# Patient Record
Sex: MALE | Race: WHITE | NOT HISPANIC OR LATINO | Employment: OTHER | URBAN - METROPOLITAN AREA
[De-identification: names, ages, dates, MRNs, and addresses within clinical notes are randomized per-mention and may not be internally consistent; named-entity substitution may affect disease eponyms.]

---

## 2017-03-12 ENCOUNTER — OFFICE VISIT (OUTPATIENT)
Dept: URGENT CARE | Facility: MEDICAL CENTER | Age: 72
End: 2017-03-12
Payer: MEDICARE

## 2017-03-12 DIAGNOSIS — R69 ILLNESS: ICD-10-CM

## 2017-03-12 PROCEDURE — G0463 HOSPITAL OUTPT CLINIC VISIT: HCPCS

## 2017-03-12 PROCEDURE — 99213 OFFICE O/P EST LOW 20 MIN: CPT

## 2017-03-13 ENCOUNTER — APPOINTMENT (OUTPATIENT)
Dept: LAB | Facility: HOSPITAL | Age: 72
End: 2017-03-13
Payer: MEDICARE

## 2017-03-13 DIAGNOSIS — R69 ILLNESS: ICD-10-CM

## 2017-03-13 PROCEDURE — 87798 DETECT AGENT NOS DNA AMP: CPT

## 2017-03-14 LAB
FLUAV AG SPEC QL: DETECTED
FLUBV AG SPEC QL: ABNORMAL
RSV B RNA SPEC QL NAA+PROBE: ABNORMAL

## 2017-06-30 ENCOUNTER — ALLSCRIPTS OFFICE VISIT (OUTPATIENT)
Dept: OTHER | Facility: OTHER | Age: 72
End: 2017-06-30

## 2017-08-04 ENCOUNTER — GENERIC CONVERSION - ENCOUNTER (OUTPATIENT)
Dept: OTHER | Facility: OTHER | Age: 72
End: 2017-08-04

## 2018-01-12 VITALS
DIASTOLIC BLOOD PRESSURE: 82 MMHG | BODY MASS INDEX: 36.26 KG/M2 | WEIGHT: 231 LBS | HEIGHT: 67 IN | HEART RATE: 72 BPM | SYSTOLIC BLOOD PRESSURE: 140 MMHG

## 2018-02-01 RX ORDER — SODIUM FLUORIDE 5 MG/ML
PASTE, DENTIFRICE DENTAL
COMMUNITY
End: 2018-02-02 | Stop reason: ALTCHOICE

## 2018-02-01 RX ORDER — CHLORAL HYDRATE 500 MG
1000 CAPSULE ORAL SEE ADMIN INSTRUCTIONS
COMMUNITY
End: 2019-06-04

## 2018-02-01 RX ORDER — VALSARTAN AND HYDROCHLOROTHIAZIDE 160; 25 MG/1; MG/1
1 TABLET ORAL DAILY
COMMUNITY
Start: 2012-04-19 | End: 2018-02-02 | Stop reason: SDUPTHER

## 2018-02-01 RX ORDER — PRAVASTATIN SODIUM 20 MG
1 TABLET ORAL DAILY
COMMUNITY

## 2018-02-01 RX ORDER — B-COMPLEX WITH VITAMIN C
1 TABLET ORAL DAILY
COMMUNITY

## 2018-02-01 RX ORDER — MULTIVITAMIN
1 TABLET ORAL DAILY
COMMUNITY

## 2018-02-02 ENCOUNTER — OFFICE VISIT (OUTPATIENT)
Dept: CARDIOLOGY CLINIC | Facility: CLINIC | Age: 73
End: 2018-02-02
Payer: MEDICARE

## 2018-02-02 ENCOUNTER — ALLSCRIPTS OFFICE VISIT (OUTPATIENT)
Dept: OTHER | Facility: OTHER | Age: 73
End: 2018-02-02

## 2018-02-02 VITALS
BODY MASS INDEX: 36.73 KG/M2 | HEART RATE: 63 BPM | WEIGHT: 234 LBS | SYSTOLIC BLOOD PRESSURE: 132 MMHG | DIASTOLIC BLOOD PRESSURE: 76 MMHG | HEIGHT: 67 IN

## 2018-02-02 DIAGNOSIS — I48.20 CHRONIC A-FIB (HCC): Primary | ICD-10-CM

## 2018-02-02 DIAGNOSIS — I15.9 SECONDARY HYPERTENSION: Primary | ICD-10-CM

## 2018-02-02 DIAGNOSIS — I48.19 PERSISTENT ATRIAL FIBRILLATION (HCC): ICD-10-CM

## 2018-02-02 PROCEDURE — 93000 ELECTROCARDIOGRAM COMPLETE: CPT | Performed by: INTERNAL MEDICINE

## 2018-02-02 PROCEDURE — 99214 OFFICE O/P EST MOD 30 MIN: CPT | Performed by: INTERNAL MEDICINE

## 2018-02-02 RX ORDER — VALSARTAN AND HYDROCHLOROTHIAZIDE 160; 25 MG/1; MG/1
1 TABLET ORAL DAILY
Qty: 90 TABLET | Refills: 3 | Status: SHIPPED | OUTPATIENT
Start: 2018-02-02 | End: 2019-01-10 | Stop reason: SDUPTHER

## 2018-02-02 RX ORDER — AMOXICILLIN 500 MG/1
2000 CAPSULE ORAL DAILY PRN
COMMUNITY

## 2018-02-02 NOTE — TELEPHONE ENCOUNTER
Doni Faust wants Valsartan and Xarelto ordered thru his "postal prescription" but that pharmacy  Information is not on file  I tried calling his home # several times, no answer, no machine  I tried work #, he does not work there, incorrect #  Also called [facility] to get Medical Records # to request lab work from 2017  No fax # was provided yet, I'll call again

## 2018-02-02 NOTE — PROGRESS NOTES
Cardiology Follow Up    Sung Bautista  1945  [id number]  HEART & VASCULAR [facility] [place]  [address]     HPI: 8 month F/U AS/AF and prior small stroke with onset AF, very active and no bruising bleeding on ASA and Xarelto; no falls, regular daily exercise; home BPs normal, he brought list today, labs done regularly but not yet available    CV PMH/AS/stroke/AF; probably bicuspid valve, which became symptomatic in 2007  #23 bioprosthesis with excellent clinical results  PAFib post op, warfarin D/Gerard 2008  Cath 2007 minimal CAD  Root/Ao diameter nl   Atrial fibrillation, silent onset 2012, presented as stroke mild speech, visual and motor deficit, which have cleared  No real permanent deficit other than possible short term memory problem  Testing: Echo 2016, AV mean 20, EF 60%, aorta diameter nl; Holter 2016, mean rate 63,     BP excellent control, rare orthostasis       1  Chronic a-fib ([address] )  POCT ECG    ECG 12 lead       Interval History: no events  [place] 2017 was not bridged, next in 5 years    There is no problem list on file for this patient  No past medical history on file  Social History     Social History    Marital status: /Civil Union     Spouse name: N/A    Number of children: N/A    Years of education: N/A     Occupational History    Not on file  Social History Main Topics    Smoking status: Former Smoker     Years: 25 00     Types: Cigarettes     Quit date: 1992    Smokeless tobacco: Never Used    Alcohol use Not on file    Drug use: Unknown    Sexual activity: Not on file     Other Topics Concern    Not on file     Social History Narrative    No narrative on file      No family history on file  No past surgical history on file      Current Outpatient Prescriptions:     amoxicillin (AMOXIL) 500 mg capsule, Take 2,000 mg by mouth daily as needed (prior to dentist appt), Disp: , Rfl:     aspirin 81 MG tablet, Take 1 tablet by mouth daily, Disp: , Rfl:     Multiple Vitamin Essential TABS, Take 1 tablet by mouth daily, Disp: , Rfl:     Omega-3 Fatty Acids (FISH OIL) 1,000 mg, Take by mouth, Disp: , Rfl:     pravastatin (PRAVACHOL) 20 mg tablet, Take 1 tablet by mouth daily, Disp: , Rfl:     rivaroxaban (XARELTO) 20 mg tablet, Take 1 tablet by mouth daily, Disp: , Rfl:     valsartan-hydrochlorothiazide (DIOVAN-HCT) 160-25 MG per tablet, Take 1 tablet by mouth daily, Disp: , Rfl:     VITAMIN B COMPLEX-C CAPS, Take 1 capsule by mouth daily, Disp: , Rfl:   No Known Allergies    Labs:  No visits with results within 6 Month(s) from this visit  Latest known visit with results is:   Appointment on 03/13/2017   Component Date Value    INFLU A PCR 03/13/2017 Detected*    INFLU B PCR 03/13/2017 None Detected     RSV PCR 03/13/2017 None Detected      Imaging: No results found  Review of Systems:  Review of Systems   Constitutional: Negative for activity change and appetite change  Respiratory: Negative for cough, choking, chest tightness and shortness of breath  Cardiovascular: Negative for chest pain, palpitations and leg swelling  Endocrine: Negative for heat intolerance  Neurological: Negative for dizziness, syncope, facial asymmetry, weakness and light-headedness  Physical Exam:  Physical Exam   Constitutional: He appears well-developed and well-nourished  Neck: Normal carotid pulses, no hepatojugular reflux and no JVD present  Carotid bruit is not present  No tracheal deviation present  No thyromegaly present  Cardiovascular: Exam reveals no gallop  Murmur (soft AO II) heard  Pulses:       Dorsalis pedis pulses are 3+ on the right side, and 3+ on the left side  Posterior tibial pulses are 3+ on the right side, and 3+ on the left side  Pulmonary/Chest: Effort normal and breath sounds normal  No accessory muscle usage   No respiratory distress  He has no rales  Abdominal: Soft  Bowel sounds are normal  He exhibits no distension  There is no tenderness         Discussion/Summary: Stable 8 month return, no new testing

## 2018-02-15 NOTE — PROGRESS NOTES
I have reviewed the notes, assessments, and/or procedures performed by [facility], I  with her/his documentation of Ricky Rojas  Please tell him cholesterol is still high despite pravastatin, it was 223      I cannot recall if he tolerated higher dose pravastatin over be willing to switch to a better statin, atorvastatin or rosuvastatin, both are now generic so 6 been should not be an issue

## 2018-05-14 ENCOUNTER — HOSPITAL ENCOUNTER (EMERGENCY)
Facility: HOSPITAL | Age: 73
Discharge: HOME | End: 2018-05-14
Attending: EMERGENCY MEDICINE
Payer: MEDICARE

## 2018-05-14 ENCOUNTER — HOSPITAL ENCOUNTER (EMERGENCY)
Facility: HOSPITAL | Age: 73
Discharge: HOME | End: 2018-05-14
Attending: EMERGENCY MEDICINE | Admitting: EMERGENCY MEDICINE
Payer: MEDICARE

## 2018-05-14 VITALS
BODY MASS INDEX: 36.95 KG/M2 | RESPIRATION RATE: 18 BRPM | HEART RATE: 86 BPM | SYSTOLIC BLOOD PRESSURE: 146 MMHG | OXYGEN SATURATION: 99 % | WEIGHT: 235.89 LBS | TEMPERATURE: 98.6 F | DIASTOLIC BLOOD PRESSURE: 80 MMHG

## 2018-05-14 VITALS
BODY MASS INDEX: 36.95 KG/M2 | RESPIRATION RATE: 20 BRPM | DIASTOLIC BLOOD PRESSURE: 87 MMHG | HEART RATE: 96 BPM | SYSTOLIC BLOOD PRESSURE: 177 MMHG | OXYGEN SATURATION: 100 % | TEMPERATURE: 99.2 F | WEIGHT: 235.89 LBS

## 2018-05-14 DIAGNOSIS — R04.0 RIGHT-SIDED EPISTAXIS: Primary | ICD-10-CM

## 2018-05-14 LAB
APTT PPP: 36 SECONDS (ref 23–35)
BASOPHILS # BLD AUTO: 0.03 THOUSANDS/ΜL (ref 0–0.1)
BASOPHILS NFR BLD AUTO: 0 % (ref 0–1)
EOSINOPHIL # BLD AUTO: 0.04 THOUSAND/ΜL (ref 0–0.61)
EOSINOPHIL NFR BLD AUTO: 1 % (ref 0–6)
ERYTHROCYTE [DISTWIDTH] IN BLOOD BY AUTOMATED COUNT: 12.6 % (ref 11.6–15.1)
HCT VFR BLD AUTO: 44.2 % (ref 36.5–49.3)
HGB BLD-MCNC: 15.2 G/DL (ref 12–17)
INR PPP: 1.31 (ref 0.86–1.16)
LYMPHOCYTES # BLD AUTO: 1.29 THOUSANDS/ΜL (ref 0.6–4.47)
LYMPHOCYTES NFR BLD AUTO: 15 % (ref 14–44)
MCH RBC QN AUTO: 30.4 PG (ref 26.8–34.3)
MCHC RBC AUTO-ENTMCNC: 34.4 G/DL (ref 31.4–37.4)
MCV RBC AUTO: 88 FL (ref 82–98)
MONOCYTES # BLD AUTO: 0.84 THOUSAND/ΜL (ref 0.17–1.22)
MONOCYTES NFR BLD AUTO: 10 % (ref 4–12)
NEUTROPHILS # BLD AUTO: 6.42 THOUSANDS/ΜL (ref 1.85–7.62)
NEUTS SEG NFR BLD AUTO: 74 % (ref 43–75)
PLATELET # BLD AUTO: 285 THOUSANDS/UL (ref 149–390)
PMV BLD AUTO: 11.7 FL (ref 8.9–12.7)
PROTHROMBIN TIME: 16.7 SECONDS (ref 12.1–14.4)
RBC # BLD AUTO: 5 MILLION/UL (ref 3.88–5.62)
WBC # BLD AUTO: 8.62 THOUSAND/UL (ref 4.31–10.16)

## 2018-05-14 PROCEDURE — 85610 PROTHROMBIN TIME: CPT | Performed by: PHYSICIAN ASSISTANT

## 2018-05-14 PROCEDURE — 36415 COLL VENOUS BLD VENIPUNCTURE: CPT | Performed by: PHYSICIAN ASSISTANT

## 2018-05-14 PROCEDURE — 99283 EMERGENCY DEPT VISIT LOW MDM: CPT

## 2018-05-14 PROCEDURE — 85025 COMPLETE CBC W/AUTO DIFF WBC: CPT | Performed by: PHYSICIAN ASSISTANT

## 2018-05-14 PROCEDURE — 99284 EMERGENCY DEPT VISIT MOD MDM: CPT

## 2018-05-14 PROCEDURE — 85730 THROMBOPLASTIN TIME PARTIAL: CPT | Performed by: PHYSICIAN ASSISTANT

## 2018-05-14 RX ORDER — TRANEXAMIC ACID 100 MG/ML
500 INJECTION, SOLUTION INTRAVENOUS ONCE
Status: COMPLETED | OUTPATIENT
Start: 2018-05-14 | End: 2018-05-14

## 2018-05-14 RX ORDER — OXYMETAZOLINE HYDROCHLORIDE 0.05 G/100ML
2 SPRAY NASAL ONCE
Status: COMPLETED | OUTPATIENT
Start: 2018-05-14 | End: 2018-05-14

## 2018-05-14 RX ADMIN — TOPICAL ANESTHETIC 2 SPRAY: 200 SPRAY DENTAL; PERIODONTAL at 11:25

## 2018-05-14 RX ADMIN — TRANEXAMIC ACID 500 MG: 100 INJECTION, SOLUTION INTRAVENOUS at 10:00

## 2018-05-14 RX ADMIN — OXYMETAZOLINE HYDROCHLORIDE 2 SPRAY: 5 SPRAY NASAL at 10:00

## 2018-05-14 RX ADMIN — Medication 1 APPLICATION: at 23:49

## 2018-05-14 RX ADMIN — SILVER NITRATE APPLICATORS 1 APPLICATOR: 25; 75 STICK TOPICAL at 11:25

## 2018-05-14 NOTE — ED PROVIDER NOTES
History  Chief Complaint   Patient presents with    Nose Bleed     Pt brought to ER via EMS from home with c/o B/L nare epistaxis s/p 1 episode yesterday and after waking up this morning "feeling dripping in the back of my throat"  No active bleeding noted D/T pt "shoved 2 cotton balls in my nose"  +Xarelto     64-year-old male with past medical history of CVA, hypertension, atrial fibrillation, bladder cancer, who is currently taking Xarelto for DVT and prosthetic heart valve, who presents to the emergency department for epistaxis x3 days  Patient states that he has had 1 episode of epistaxis daily for the past 3 days  He endorses multiple large clots  States that epistaxis usually starts after he sneezes or blows his nose very strongly  States that the episodes usually stop after 30 minutes with gauze or cotton inserted into the nostrils  Denies fevers, chills, headaches, dizziness, chest pain, shortness of breath, coughing  Patient has been off the Xarelto previously for 1-2 days as needed for procedures  Has not taken his dose of Xarelto today  Denies new bleeding from his gums  History provided by:  Patient   used: No        Prior to Admission Medications   Prescriptions Last Dose Informant Patient Reported? Taking?    Multiple Vitamin Essential TABS  Self Yes Yes   Sig: Take 1 tablet by mouth daily   Omega-3 Fatty Acids (FISH OIL) 1,000 mg  Self Yes Yes   Sig: Take 1,000 mg by mouth see administration instructions 2 times a week    VITAMIN B COMPLEX-C CAPS  Self Yes Yes   Sig: Take 1 capsule by mouth daily   amoxicillin (AMOXIL) 500 mg capsule   Yes Yes   Sig: Take 2,000 mg by mouth daily as needed (prior to dentist appt)   aspirin 81 MG tablet  Self Yes Yes   Sig: Take 1 tablet by mouth daily   pravastatin (PRAVACHOL) 20 mg tablet  Self Yes Yes   Sig: Take 1 tablet by mouth daily   rivaroxaban (XARELTO) 20 mg tablet   No Yes   Sig: Take 1 tablet (20 mg total) by mouth daily valsartan-hydrochlorothiazide (DIOVAN-HCT) 160-25 MG per tablet   No Yes   Sig: Take 1 tablet by mouth daily      Facility-Administered Medications: None       Past Medical History:   Diagnosis Date    [facility]     Cancer [facility]     Cardiac disease     Hypertension     Stroke [facility])        Past Surgical History:   Procedure Laterality Date    AORTIC VALVE SURGERY  10/30/2007    bovine pericardial heart valve    CARDIAC SURGERY      CATARACT EXTRACTION      CHOLECYSTECTOMY      COLONOSCOPY      EYE SURGERY      SINUS SURGERY      SPLENECTOMY, TOTAL         History reviewed  No pertinent family history  I have reviewed and agree with the history as documented  Social History   Substance Use Topics    Smoking status: Former Smoker     Years: 25 00     Types: Cigarettes     Quit date: 1992    Smokeless tobacco: Never Used    Alcohol use No        Review of Systems   Constitutional: Negative for chills and fever  HENT: Positive for nosebleeds  Negative for congestion, ear pain, postnasal drip, rhinorrhea, sinus pain, sinus pressure, sore throat and trouble swallowing  Eyes: Negative  Respiratory: Negative for cough, chest tightness, shortness of breath and wheezing  Cardiovascular: Negative for chest pain, palpitations and leg swelling  Gastrointestinal: Negative for abdominal pain, anal bleeding, constipation, diarrhea, nausea and vomiting  Genitourinary: Negative for dysuria, flank pain, frequency, hematuria and urgency  Musculoskeletal: Negative for arthralgias, back pain, gait problem, joint swelling, myalgias, neck pain and neck stiffness  Skin: Negative for color change, pallor, rash and wound  Neurological: Negative for dizziness, syncope, weakness, light-headedness, numbness and headaches         Physical Exam  ED Triage Vitals [05/14/18 0825]   Temperature Pulse Respirations Blood Pressure SpO2   98 6 °F (37 °C) 91 19 (!) 181/94 100 %      Temp Source Heart Rate Source Patient Position - Orthostatic VS BP Location FiO2 (%)   Oral Monitor Sitting Right arm --      Pain Score       No Pain           Orthostatic Vital Signs  Vitals:    05/14/18 0825 05/14/18 0900 05/14/18 1023 05/14/18 1100   BP: (!) 181/94 137/88 151/92 146/80   Pulse: 91 70 80 86   Patient Position - Orthostatic VS: Sitting Sitting Lying Lying       Physical Exam   Constitutional: He is oriented to person, place, and time  He appears well-developed and well-nourished  No distress  HENT:   Head: Normocephalic and atraumatic  Gauze that was placed to the bilateral nares was removed, and there is active bleeding from the right naris, on the anterior nasal septum  Scant post nasal drip with blood to the posterior oropharynx  Eyes: Conjunctivae and EOM are normal  Pupils are equal, round, and reactive to light  Neck: Normal range of motion  Neck supple  Cardiovascular: Normal rate, regular rhythm and intact distal pulses  Pulmonary/Chest: Effort normal and breath sounds normal  He has no wheezes  He has no rales  Abdominal: Soft  Bowel sounds are normal  He exhibits no distension  There is no tenderness  There is no rebound and no guarding  Musculoskeletal: Normal range of motion  He exhibits no edema or tenderness  Neurological: He is alert and oriented to person, place, and time  No cranial nerve deficit or sensory deficit  He exhibits normal muscle tone  Coordination normal    Skin: Skin is warm and dry  Capillary refill takes less than 2 seconds  He is not diaphoretic  Psychiatric: He has a normal mood and affect  His behavior is normal    Nursing note and vitals reviewed        ED Medications  Medications   oxymetazoline (AFRIN) 0 05 % nasal spray 2 spray (2 sprays Each Nare Given by Other 5/14/18 1000)   tranexamic acid 100mg/mL (for epistaxis) 500 mg (500 mg Nasal Given by Other 5/14/18 1000)   benzocaine (HURRICAINE) 20 % mucosal spray 2 spray (2 sprays Mucosal Given by Other 5/14/18 1125)   silver nitrate-potassium nitrate (ARZOL SILVER NITRATE) 17-06 % applicator 1 applicator (1 applicator Topical Given by Other 5/14/18 1125)       Diagnostic Studies  Results Reviewed     Procedure Component Value Units Date/Time    Protime-INR [[id number]]  (Abnormal) Collected:  05/14/18 1023    Lab Status:  Final result Specimen:  Blood from Arm, Right Updated:  05/14/18 1052     Protime 16 7 (H) seconds      INR 1 31 (H)    APTT [[id number]]  (Abnormal) Collected:  05/14/18 1023    Lab Status:  Final result Specimen:  Blood from Arm, Right Updated:  05/14/18 1052     PTT 36 (H) seconds     CBC and differential [[id number]]  (Normal) Collected:  05/14/18 1023    Lab Status:  Final result Specimen:  Blood from Arm, Right Updated:  05/14/18 1031     WBC 8 62 Thousand/uL      RBC 5 00 Million/uL      Hemoglobin 15 2 g/dL      Hematocrit 44 2 %      MCV 88 fL      MCH 30 4 pg      MCHC 34 4 g/dL      RDW 12 6 %      MPV 11 7 fL      Platelets 692 Thousands/uL      Neutrophils Relative 74 %      Lymphocytes Relative 15 %      Monocytes Relative 10 %      Eosinophils Relative 1 %      Basophils Relative 0 %      Neutrophils Absolute 6 42 Thousands/µL      Lymphocytes Absolute 1 29 Thousands/µL      Monocytes Absolute 0 84 Thousand/µL      Eosinophils Absolute 0 04 Thousand/µL      Basophils Absolute 0 03 Thousands/µL                  No orders to display              Procedures  Epistaxis Mgmt  Date/Time: 5/14/2018 11:36 AM  Performed by: Barbara Meier  Authorized by: Yuko Hansen     Patient location:  ED  Consent:     Consent obtained:  Verbal    Consent given by:  Patient    Risks discussed:  Bleeding, infection, nasal injury and pain    Alternatives discussed:  No treatment, delayed treatment and alternative treatment  Universal protocol:     Patient identity confirmed:  Verbally with patient  Anesthesia (see MAR for exact dosages):      Anesthesia method:  Topical application    Local Therapeutics:  Hurricaine spray  Procedure details:     Treatment site:  R anterior    Hemostasis method:  Silver nitrate  Post-procedure details:     Assessment:  Bleeding stopped    Patient tolerance of procedure: Tolerated well, no immediate complications             Phone Contacts  ED Phone Contact    ED Course                               MDM  Number of Diagnoses or Management Options  Right-sided epistaxis:   Diagnosis management comments: 81-year-old male with past medical history of CVA, hypertension, atrial fibrillation, bladder cancer, who is currently taking Xarelto for DVT and prosthetic heart valve, who presents to the emergency department for epistaxis x3 days  Was able to obtain control of epistaxis with use of Afrin and pressure  However as patient continues to have daily recurrences of epistaxis, discussed with him use of silver nitrate cautery versus placement of packing versus simple use of Afrin and pressure  Patient would like to trial silver nitrate cautery, and understands that there might be recurrence of bleeding as posterior bleeding sites in the nares cannot be reached with silver nitrate sticks  Patient was observed in the emergency department for 1 hour without any recurrence of bleeding  He was instructed to follow up with his ENT, and agrees  Also instructed not to blow his nose  Instructed to apply Afrin and pressure if the bleeding reoccurs  Labs of CBC and coag factors were unremarkable  Discharge home at this time         Amount and/or Complexity of Data Reviewed  Clinical lab tests: ordered and reviewed      CritCare Time    Disposition  Final diagnoses:   Right-sided epistaxis     Time reflects when diagnosis was documented in both MDM as applicable and the Disposition within this note     Time User Action Codes Description Comment    5/14/2018 11:33 AM Edson Martinez Add [R04 0] Right-sided epistaxis       ED Disposition     ED Disposition Condition Comment    Discharge Giana Mendez discharge to home/self care  Condition at discharge: Good        Follow-up Information     Follow up With Specialties Details Why Blaise Malone (ENT)  Schedule an appointment as soon as possible for a visit in 2 days          Discharge Medication List as of 5/14/2018 11:35 AM      CONTINUE these medications which have NOT CHANGED    Details   amoxicillin (AMOXIL) 500 mg capsule Take 2,000 mg by mouth daily as needed (prior to dentist appt), Historical Med      aspirin 81 MG tablet Take 1 tablet by mouth daily, Historical Med      Multiple Vitamin Essential TABS Take 1 tablet by mouth daily, Historical Med      Omega-3 Fatty Acids (FISH OIL) 1,000 mg Take 1,000 mg by mouth see administration instructions 2 times a week , Historical Med      pravastatin (PRAVACHOL) 20 mg tablet Take 1 tablet by mouth daily, Historical Med      rivaroxaban (XARELTO) 20 mg tablet Take 1 tablet (20 mg total) by mouth daily, Starting Fri 2/2/2018, Normal      valsartan-hydrochlorothiazide (DIOVAN-HCT) 160-25 MG per tablet Take 1 tablet by mouth daily, Starting Fri 2/2/2018, Normal      VITAMIN B COMPLEX-C CAPS Take 1 capsule by mouth daily, Historical Med           No discharge procedures on file      ED Provider  Electronically Signed by           Mary Vivas PA-C  05/16/18 8314

## 2018-05-14 NOTE — ED NOTES
Pt laying on stretcher with HOB elevated in negative distress  No active bleeding noted  +patent airway  No complaints at present time  Will continue to monitor       Neymar Haddad RN  05/14/18 0946

## 2018-05-14 NOTE — ED NOTES
Pt laying on stretcher with HOB elevated in negative distress  Splint on nose intact, no active bleeding noted at present time  +Patent airway  VS  Wife at bedside  Will continue to monitor       Ashwin Mattson RN  05/14/18 3032

## 2018-05-14 NOTE — DISCHARGE INSTRUCTIONS
Epistaxis   WHAT YOU SHOULD KNOW:   Epistaxis is a nosebleed  A nosebleed occurs when the blood vessels near the surface of the nasal cavity are injured or damaged  AFTER YOU LEAVE:   Medicines:   · Nasal sprays:  Vasoconstrictor nasal spray is a medicine that helps make nasal blood vessels narrower  This limits the blood flow and stops the bleeding  This medicine also decreases the swelling inside your nose and helps you breathe easier  You may also be directed to use saline or other nasal sprays to add moisture to your nose  · Antibiotics: This medicine is given to help treat or prevent an infection caused by bacteria  · Take your medicine as directed  Call your healthcare provider if you think your medicine is not helping or if you have side effects  Tell him if you are allergic to any medicine  Keep a list of the medicines, vitamins, and herbs you take  Include the amounts, and when and why you take them  Bring the list or the pill bottles to follow-up visits  Carry your medicine list with you in case of an emergency  Follow up with your primary healthcare provider or otolaryngologist within 2 to 3 days or as directed: Any packing in your nose should be removed within 2 to 3 days  Write down your questions so you remember to ask them during your visits  First aid:   · Sit up and lean forward: This will help prevent you from swallowing blood  Spit blood and saliva into a bowl  · Apply pressure to your nose:  Use 2 fingers to pinch your nose shut for 10 minutes  This will help stop the bleeding  Breathe through your mouth  · Apply ice:  Use a cold pack or put crushed ice in a bag, cover with a towel, and place on the bridge of your nose  · Nasal packing:  Pack your nose with a cotton ball, tissue, tampon, or gauze bandage to stop the bleeding  Prevent epistaxis:  · Avoid nose picking and blowing your nose too hard: You can irritate or damage your nose if you pick it   Blowing your nose too hard may cause the bleeding to start again  · Avoid irritants:  Substances that can irritate your nose should be avoided  These include tobacco smoke and chemical sprays such as  that contain ammonia  · Use a cool mist humidifier in your home: This will add the moisture to the air and help keep your nose moist      · Put a small amount of petroleum jelly inside your nostrils: You may apply a small amount of petroleum jelly if you do not have a nasal packing  This will help keep your nose from drying out or getting irritated  Do not put anything else inside your nose unless your primary healthcare provider tells you to do so  Contact your primary healthcare provider or otolaryngologist if:   · You have a fever and are vomiting  · You have pain in and around your nose that is getting worse even after you take pain medicines  · Your nasal pack is loose  · You have questions or concerns about your condition or care  Seek care immediately if:   · Your nasal packing is soaked with blood  · Your nose is still bleeding after 20 minutes, even after you pinch it  · You have a foul-smelling discharge coming out of your nose  · You feel so weak and dizzy that you have trouble standing up  · You have trouble breathing or talking  © 2014 [address] [employer] is for End User's use only and may not be sold, redistributed or otherwise used for commercial purposes  All illustrations and images included in CareNotes® are the copyrighted property of [employer]  or [address]  The above information is an  only  It is not intended as medical advice for individual conditions or treatments  Talk to your doctor, nurse or pharmacist before following any medical regimen to see if it is safe and effective for you

## 2018-05-15 ENCOUNTER — TELEPHONE (OUTPATIENT)
Dept: CARDIOLOGY CLINIC | Facility: CLINIC | Age: 73
End: 2018-05-15

## 2018-05-15 NOTE — ED PROVIDER NOTES
History  Chief Complaint   Patient presents with   Robbin De Santiago presents to the ED for evaluation of a nose bleed, pt was seen here this morning and DC for the same thing, pt reports it "started gushing again 1 hour ago" Pt used medication he was sent home with "but it doesn't seem to have stopped anything " Pt applying pressure at time of arrival, takes Marina Ross "for last 5 years" Pt also reports "head clogged, i can't hear and I feel like i'm in a fish bowl"        History provided by:  Patient   used: No     71-year-old male presents with recurrent right-sided epistaxis  Was seen in the ED earlier today and was cauterized  Notes that he is fine home for few hours and then it started gushing  Has been having bleeding over the past few days  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 Taking?    Multiple Vitamin Essential TABS  Self Yes No   Sig: Take 1 tablet by mouth daily   Omega-3 Fatty Acids (FISH OIL) 1,000 mg  Self Yes No   Sig: Take 1,000 mg by mouth see administration instructions 2 times a week    VITAMIN B COMPLEX-C CAPS  Self Yes No   Sig: Take 1 capsule by mouth daily   amoxicillin (AMOXIL) 500 mg capsule   Yes No   Sig: Take 2,000 mg by mouth daily as needed (prior to dentist appt)   aspirin 81 MG tablet  Self Yes No   Sig: Take 1 tablet by mouth daily   pravastatin (PRAVACHOL) 20 mg tablet  Self Yes No   Sig: Take 1 tablet by mouth daily   rivaroxaban (XARELTO) 20 mg tablet   No No   Sig: Take 1 tablet (20 mg total) by mouth daily valsartan-hydrochlorothiazide (DIOVAN-HCT) 160-25 MG per tablet   No No   Sig: Take 1 tablet by mouth daily      Facility-Administered Medications: None       Past Medical History:   Diagnosis Date    [facility]     Cancer [facility]     Cardiac disease     Hypertension     Stroke [facility])        Past Surgical History:   Procedure Laterality Date    AORTIC VALVE SURGERY  10/30/2007    bovine pericardial heart valve    CARDIAC SURGERY      CATARACT EXTRACTION      CHOLECYSTECTOMY      COLONOSCOPY      EYE SURGERY      SINUS SURGERY      SPLENECTOMY, TOTAL         History reviewed  No pertinent family history  I have reviewed and agree with the history as documented  Social History   Substance Use Topics    Smoking status: Former Smoker     Years: 25 00     Types: Cigarettes     Quit date: 1992    Smokeless tobacco: Never Used    Alcohol use No        Review of Systems   Constitutional: Negative for activity change and fever  HENT: Positive for nosebleeds  Negative for ear pain, trouble swallowing and voice change  Respiratory: Negative for cough and shortness of breath  Cardiovascular: Negative for chest pain and palpitations  Neurological: Negative for dizziness, weakness, light-headedness and numbness  All other systems reviewed and are negative  Physical Exam  ED Triage Vitals   Temperature Pulse Respirations Blood Pressure SpO2   05/14/18 2015 05/14/18 1959 05/14/18 1959 05/14/18 1959 05/14/18 1959   99 2 °F (37 3 °C) 96 20 (!) 177/87 100 %      Temp Source Heart Rate Source Patient Position - Orthostatic VS BP Location FiO2 (%)   05/14/18 2015 05/14/18 1959 05/14/18 1959 05/14/18 1959 --   Axillary Monitor Sitting Right arm       Pain Score       --                  Orthostatic Vital Signs  Vitals:    05/14/18 1959   BP: (!) 177/87   Pulse: 96   Patient Position - Orthostatic VS: Sitting       Physical Exam   Constitutional: He is oriented to person, place, and time   He appears well-developed and well-nourished  No distress  HENT:   Head: Normocephalic  Bright right-sided epistaxis  Cardiovascular: Normal rate and regular rhythm  Pulmonary/Chest: Effort normal  No respiratory distress  Musculoskeletal: Normal range of motion  He exhibits no edema  Neurological: He is alert and oriented to person, place, and time  Skin: Skin is warm and dry  Psychiatric: He has a normal mood and affect  His behavior is normal    Nursing note and vitals reviewed  ED Medications  Medications   LET gel 1 application (1 application Topical Given by Other 5/14/18 7620)       Diagnostic Studies  Results Reviewed     None                 No orders to display              Procedures  Epistaxis Mgmt  Date/Time: 5/14/2018 11:52 PM  Performed by: Cindy Zapien by: Nick Chirinos     Patient location:  ED  Consent:     Consent obtained:  Verbal    Consent given by:  Patient  Universal protocol:     Patient identity confirmed:  Verbally with patient  Anesthesia (see MAR for exact dosages): Anesthesia method:  Topical application    Local [employer]:  LET  Procedure details:     Treatment site:  R anterior    Hemostasis method:  Merocel sponge    Treatment episode: recurring    Post-procedure details:     Assessment:  Bleeding stopped    Patient tolerance of procedure: Tolerated well, no immediate complications           Phone Contacts  ED Phone Contact    ED Course                               MDM  Number of Diagnoses or Management Options  Right-sided epistaxis:   Diagnosis management comments: 29-year-old male on Xarelto presented with recurrent epistaxis over the last few days  Cauterized in the emergency department earlier today with repeat bleeding from the right side  Patient initially packed with Merocel sponge in continued to have a little bit tripping  Replaced larger Merocel sponge with LET gel    Monitored for about an hour in the emergency department without recurrence of bleeding  Stable for discharge home  Will follow up with ENT  Advised to hold Xarelto for 1 night  Patient Progress  Patient progress: improved    CritCare Time    Disposition  Final diagnoses:   Right-sided epistaxis     Time reflects when diagnosis was documented in both MDM as applicable and the Disposition within this note     Time User Action Codes Description Comment    5/14/2018 11:37 PM Pavan Zavala Add [R04 0] Right-sided epistaxis       ED Disposition     ED Disposition Condition Comment    Discharge  Rosa Garner discharge to home/self care      Condition at discharge: Good        Follow-up Information     Follow up With Specialties Details Why Contact Info Additional Information    Pepe Kirk MD Otolaryngology   [address] [address]  [phone]       [facility] Emergency Department Emergency Medicine  If symptoms worsen [address]  [phone] AN ED, [address], [place], [address] [facility], [address]        Discharge Medication List as of 5/14/2018 11:38 PM      CONTINUE these medications which have NOT CHANGED    Details   amoxicillin (AMOXIL) 500 mg capsule Take 2,000 mg by mouth daily as needed (prior to dentist appt), Historical Med      aspirin 81 MG tablet Take 1 tablet by mouth daily, Historical Med      Multiple Vitamin Essential TABS Take 1 tablet by mouth daily, Historical Med      Omega-3 Fatty Acids (FISH OIL) 1,000 mg Take 1,000 mg by mouth see administration instructions 2 times a week , Historical Med      pravastatin (PRAVACHOL) 20 mg tablet Take 1 tablet by mouth daily, Historical Med      rivaroxaban (XARELTO) 20 mg tablet Take 1 tablet (20 mg total) by mouth daily, Starting Fri 2/2/2018, Normal      valsartan-hydrochlorothiazide (DIOVAN-HCT) 160-25 MG per tablet Take 1 tablet by mouth daily, Starting Fri 2/2/2018, Normal      VITAMIN B COMPLEX-C CAPS Take 1 capsule by mouth daily, Historical Med           No discharge procedures on file      ED Provider  Electronically Signed by           Chris Balderrama MD  05/14/18 2039

## 2018-05-15 NOTE — DISCHARGE INSTRUCTIONS
Epistaxis   WHAT YOU SHOULD KNOW:   Epistaxis is a nosebleed  A nosebleed occurs when the blood vessels near the surface of the nasal cavity are injured or damaged  AFTER YOU LEAVE:   Medicines:   · Nasal sprays:  Vasoconstrictor nasal spray is a medicine that helps make nasal blood vessels narrower  This limits the blood flow and stops the bleeding  This medicine also decreases the swelling inside your nose and helps you breathe easier  You may also be directed to use saline or other nasal sprays to add moisture to your nose  · Antibiotics: This medicine is given to help treat or prevent an infection caused by bacteria  · Take your medicine as directed  Call your healthcare provider if you think your medicine is not helping or if you have side effects  Tell him if you are allergic to any medicine  Keep a list of the medicines, vitamins, and herbs you take  Include the amounts, and when and why you take them  Bring the list or the pill bottles to follow-up visits  Carry your medicine list with you in case of an emergency  Follow up with your primary healthcare provider or otolaryngologist within 2 to 3 days or as directed: Any packing in your nose should be removed within 2 to 3 days  Write down your questions so you remember to ask them during your visits  First aid:   · Sit up and lean forward: This will help prevent you from swallowing blood  Spit blood and saliva into a bowl  · Apply pressure to your nose:  Use 2 fingers to pinch your nose shut for 10 minutes  This will help stop the bleeding  Breathe through your mouth  · Apply ice:  Use a cold pack or put crushed ice in a bag, cover with a towel, and place on the bridge of your nose  · Nasal packing:  Pack your nose with a cotton ball, tissue, tampon, or gauze bandage to stop the bleeding  Prevent epistaxis:  · Avoid nose picking and blowing your nose too hard: You can irritate or damage your nose if you pick it   Blowing your nose too hard may cause the bleeding to start again  · Avoid irritants:  Substances that can irritate your nose should be avoided  These include tobacco smoke and chemical sprays such as  that contain ammonia  · Use a cool mist humidifier in your home: This will add the moisture to the air and help keep your nose moist      · Put a small amount of petroleum jelly inside your nostrils: You may apply a small amount of petroleum jelly if you do not have a nasal packing  This will help keep your nose from drying out or getting irritated  Do not put anything else inside your nose unless your primary healthcare provider tells you to do so  Contact your primary healthcare provider or otolaryngologist if:   · You have a fever and are vomiting  · You have pain in and around your nose that is getting worse even after you take pain medicines  · Your nasal pack is loose  · You have questions or concerns about your condition or care  Seek care immediately if:   · Your nasal packing is soaked with blood  · Your nose is still bleeding after 20 minutes, even after you pinch it  · You have a foul-smelling discharge coming out of your nose  · You feel so weak and dizzy that you have trouble standing up  · You have trouble breathing or talking  © 2014 [address] is for End User's use only and may not be sold, redistributed or otherwise used for commercial purposes  All illustrations and images included in CareNotes® are the copyrighted property of A D A M [employer] Inc  or [employer] Pablo Cox  The above information is an  only  It is not intended as medical advice for individual conditions or treatments  Talk to your doctor, nurse or pharmacist before following any medical regimen to see if it is safe and effective for you

## 2018-05-17 NOTE — TELEPHONE ENCOUNTER
I called the home # and left a vmm    I called the ENT Doc-Kai Malagon - advised NO- he cannot stop Xarelto  He is high risk for stroke  Dr Elroy Aquino requested to speak directly to Dr Kerri ARMANDO

## 2018-06-20 LAB — HBA1C MFR BLD HPLC: 5.6 %

## 2018-07-10 ENCOUNTER — TRANSCRIBE ORDERS (OUTPATIENT)
Dept: ADMINISTRATIVE | Facility: HOSPITAL | Age: 73
End: 2018-07-10

## 2018-07-10 DIAGNOSIS — R51.9 FACIAL PAIN: Primary | ICD-10-CM

## 2018-07-16 ENCOUNTER — HOSPITAL ENCOUNTER (OUTPATIENT)
Dept: RADIOLOGY | Facility: MEDICAL CENTER | Age: 73
Discharge: HOME | End: 2018-07-16
Payer: MEDICARE

## 2018-07-16 DIAGNOSIS — R51.9 FACIAL PAIN: ICD-10-CM

## 2018-07-16 PROCEDURE — 70450 CT HEAD/BRAIN W/O DYE: CPT

## 2018-08-20 ENCOUNTER — TELEPHONE (OUTPATIENT)
Dept: CARDIOLOGY CLINIC | Facility: CLINIC | Age: 73
End: 2018-08-20

## 2018-08-20 NOTE — TELEPHONE ENCOUNTER
I called and spoke with Shila Santos ordered that bloodwork and they wanted it sent to you  They were aware of the ersults from Violeta

## 2018-08-20 NOTE — TELEPHONE ENCOUNTER
----- Message from Leidy Gonzalez MD sent at 8/17/2018  2:41 PM EDT -----  Please tell him screen for bood sugar is still OK

## 2018-10-05 ENCOUNTER — OFFICE VISIT (OUTPATIENT)
Dept: CARDIOLOGY CLINIC | Facility: CLINIC | Age: 73
End: 2018-10-05
Payer: MEDICARE

## 2018-10-05 VITALS
DIASTOLIC BLOOD PRESSURE: 70 MMHG | HEIGHT: 67 IN | SYSTOLIC BLOOD PRESSURE: 140 MMHG | BODY MASS INDEX: 35.94 KG/M2 | HEART RATE: 57 BPM | WEIGHT: 229 LBS

## 2018-10-05 DIAGNOSIS — I63.9 CARDIOEMBOLIC STROKE (HCC): ICD-10-CM

## 2018-10-05 DIAGNOSIS — Z95.2 H/O PROSTHETIC AORTIC VALVE REPLACEMENT: ICD-10-CM

## 2018-10-05 DIAGNOSIS — I10 HYPERTENSION, UNSPECIFIED TYPE: ICD-10-CM

## 2018-10-05 DIAGNOSIS — I48.91 ATRIAL FIBRILLATION, UNSPECIFIED TYPE (HCC): Primary | ICD-10-CM

## 2018-10-05 PROBLEM — I63.10 CEREBROVASCULAR ACCIDENT (CVA) DUE TO EMBOLISM OF PRECEREBRAL ARTERY (HCC): Status: ACTIVE | Noted: 2018-10-05

## 2018-10-05 PROCEDURE — 93000 ELECTROCARDIOGRAM COMPLETE: CPT | Performed by: INTERNAL MEDICINE

## 2018-10-05 PROCEDURE — 99214 OFFICE O/P EST MOD 30 MIN: CPT | Performed by: INTERNAL MEDICINE

## 2018-10-05 NOTE — PROGRESS NOTES
Cardiology Consultation     Dick Santillan  [id number]  1945  HEART & VASCULAR [facility]  [address] [place] [id number]    This 62-year-old gentleman is seen in 8 month follow-up of persistent atrial fibrillation, bioprosthetic aortic valve and with prior cardioembolic stroke, successful anticoagulation with the Xarelto, not warfarin  At home he is active and asymptomatic  He has no cardiopulmonary symptoms  He is bradycardiac but the has not been lightheaded or woozy  Hypertension he is on valsartan but his formulary stated that this was not made by any other companies that involuntarily to their medication off the market most home blood pressure readings are in the 120 he is  Heart rates in the 50-70 range  Aortic stenosis, he probably had a bicuspid valve which became symptomatic 2007, 20  3 bovine bioprosthesis replacement with excellent results  He continues to have almost no murmur  He had pAFib postop in the hospital   Warfarin was stopped 2008  AFib and cardioembolic stroke, he presented with this in 2012 mild speech visual in motor defect which have all cleared  This year with the the Xarelto he had epistaxis, it was held for 2 days  With procedures he has been successfully bridged using low molecular weight heparin when necessary    Cardiac testing, Holter 2016 low rate 31 max 164 max pause 3 4 sec, some wide QRS complexes no VT  Echocardiogram 2016 aortic bioprosthesis mean gradient 20  Estimated EF 60% , mild TR, velocity 3 0  Clinically he does not have sleep apnea    Sick sinus his rates have always been slow but not to the point that he needs pacemaker  1  Atrial fibrillation, unspecified type ([address] )  POCT ECG    ECG 12 lead   2  Hypertension, unspecified type     3  Cardioembolic stroke ([address] )     4   H/O prosthetic aortic valve replacement       Patient Active Problem List   Diagnosis    Hypertension    [facility]    H/O prosthetic aortic valve replacement     Past Medical History:   Diagnosis Date    Bladder cancer ([facility] )     Cancer ([facility] )     Cardiac disease     Hypertension     Stroke [facility])      Social History     Social History    Marital status: /Civil Union     Spouse name: N/A    Number of children: N/A    Years of education: N/A     Occupational History    Not on file  Social History Main Topics    Smoking status: Former Smoker     Years: 25 00     Types: Cigarettes     Quit date: 1992    Smokeless tobacco: Never Used    Alcohol use No    Drug use: Unknown    Sexual activity: Not on file     Other Topics Concern    Not on file     Social History Narrative    No narrative on file      No family history on file    Past Surgical History:   Procedure Laterality Date    AORTIC VALVE SURGERY  10/30/2007    bovine pericardial heart valve    CARDIAC SURGERY      CATARACT EXTRACTION      CHOLECYSTECTOMY      COLONOSCOPY      EYE SURGERY      SINUS SURGERY      SPLENECTOMY, TOTAL         Current Outpatient Prescriptions:     aspirin 81 MG tablet, Take 1 tablet by mouth daily, Disp: , Rfl:     Multiple Vitamin Essential TABS, Take 1 tablet by mouth daily, Disp: , Rfl:     Omega-3 Fatty Acids (FISH OIL) 1,000 mg, Take 1,000 mg by mouth see administration instructions 2 times a week , Disp: , Rfl:     pravastatin (PRAVACHOL) 20 mg tablet, Take 1 tablet by mouth daily, Disp: , Rfl:     rivaroxaban (XARELTO) 20 mg tablet, Take 1 tablet (20 mg total) by mouth daily, Disp: 90 tablet, Rfl: 3    valsartan-hydrochlorothiazide (DIOVAN-HCT) 160-25 MG per tablet, Take 1 tablet by mouth daily, Disp: 90 tablet, Rfl: 3    VITAMIN B COMPLEX-C CAPS, Take 1 capsule by mouth daily, Disp: , Rfl:     amoxicillin (AMOXIL) 500 mg capsule, Take 2,000 mg by mouth daily as needed (prior to dentist appt), Disp: , Rfl:   No Known Allergies  Vitals:    10/05/18 0906   BP: 140/70   BP Location: Right arm   Patient Position: Sitting   Cuff Size: Standard   Pulse: 57   Weight: 104 kg (229 lb)   Height: 5' 7" (1 702 m)       Labs:not applicable  Imaging: No results found  Review of Systems:  Review of Systems   Respiratory: Negative  Cardiovascular: Negative  Neurological: Negative for dizziness, syncope, weakness and light-headedness  Physical Exam:  Physical Exam   Constitutional: He appears well-nourished  Neck: Normal carotid pulses, no hepatojugular reflux and no JVD present  Carotid bruit is not present  No thyromegaly present  Cardiovascular: An irregularly irregular rhythm present  Bradycardia present  Exam reveals no gallop  Murmur (Very soft grade 2 aortic flow murmur no diastolic murmur  PMI not displaced) heard  Pulses:       Dorsalis pedis pulses are 3+ on the right side  Posterior tibial pulses are 2+ on the left side  Pulmonary/Chest: Breath sounds normal  No respiratory distress  He has no wheezes  He has no rales  Abdominal: Soft  Normal aorta  He exhibits no distension  There is no hepatosplenomegaly  Musculoskeletal: He exhibits no edema  Discussion/Summary:   At this time no additional testing ordered, previous testing 2016 and he has remained asymptomatic

## 2018-10-05 NOTE — PATIENT INSTRUCTIONS
Current medications are working well and her home blood pressure readings are excellent      Please follow-up in the when gap office in 8 months time

## 2019-01-10 DIAGNOSIS — I15.9 SECONDARY HYPERTENSION: ICD-10-CM

## 2019-01-10 DIAGNOSIS — I48.19 PERSISTENT ATRIAL FIBRILLATION (HCC): ICD-10-CM

## 2019-01-10 RX ORDER — VALSARTAN AND HYDROCHLOROTHIAZIDE 160; 25 MG/1; MG/1
1 TABLET ORAL DAILY
Qty: 90 TABLET | Refills: 3 | Status: CANCELLED | OUTPATIENT
Start: 2019-01-10

## 2019-01-10 RX ORDER — CANDESARTAN CILEXETIL AND HYDROCHLOROTHIAZIDE 32; 12.5 MG/1; MG/1
1 TABLET ORAL DAILY
Qty: 30 TABLET | Refills: 3 | Status: SHIPPED | OUTPATIENT
Start: 2019-01-10 | End: 2019-01-22 | Stop reason: SDUPTHER

## 2019-01-22 DIAGNOSIS — I15.9 SECONDARY HYPERTENSION: ICD-10-CM

## 2019-01-22 RX ORDER — CANDESARTAN CILEXETIL AND HYDROCHLOROTHIAZIDE 32; 12.5 MG/1; MG/1
1 TABLET ORAL DAILY
Qty: 90 TABLET | Refills: 3 | Status: SHIPPED | OUTPATIENT
Start: 2019-01-22 | End: 2019-01-24 | Stop reason: SDUPTHER

## 2019-01-24 DIAGNOSIS — I15.9 SECONDARY HYPERTENSION: ICD-10-CM

## 2019-01-24 RX ORDER — CANDESARTAN CILEXETIL AND HYDROCHLOROTHIAZIDE 32; 12.5 MG/1; MG/1
1 TABLET ORAL DAILY
Qty: 90 TABLET | Refills: 3 | Status: SHIPPED | OUTPATIENT
Start: 2019-01-24 | End: 2020-01-17 | Stop reason: SDUPTHER

## 2019-06-03 PROBLEM — E78.5 DYSLIPIDEMIA: Status: ACTIVE | Noted: 2019-06-03

## 2019-06-03 PROBLEM — I48.19 PERSISTENT ATRIAL FIBRILLATION (HCC): Status: ACTIVE | Noted: 2019-06-03

## 2019-06-04 ENCOUNTER — OFFICE VISIT (OUTPATIENT)
Dept: CARDIOLOGY CLINIC | Facility: MEDICAL CENTER | Age: 74
End: 2019-06-04
Payer: MEDICARE

## 2019-06-04 VITALS
DIASTOLIC BLOOD PRESSURE: 80 MMHG | BODY MASS INDEX: 36.81 KG/M2 | SYSTOLIC BLOOD PRESSURE: 144 MMHG | HEIGHT: 67 IN | WEIGHT: 234.5 LBS | OXYGEN SATURATION: 99 % | HEART RATE: 60 BPM

## 2019-06-04 DIAGNOSIS — I63.9 CARDIOEMBOLIC STROKE (HCC): ICD-10-CM

## 2019-06-04 DIAGNOSIS — Z95.2 H/O PROSTHETIC AORTIC VALVE REPLACEMENT: ICD-10-CM

## 2019-06-04 DIAGNOSIS — I10 ESSENTIAL HYPERTENSION: Primary | ICD-10-CM

## 2019-06-04 DIAGNOSIS — E78.5 DYSLIPIDEMIA: ICD-10-CM

## 2019-06-04 DIAGNOSIS — I48.19 PERSISTENT ATRIAL FIBRILLATION (HCC): ICD-10-CM

## 2019-06-04 PROCEDURE — 99214 OFFICE O/P EST MOD 30 MIN: CPT | Performed by: INTERNAL MEDICINE

## 2019-06-04 PROCEDURE — 93000 ELECTROCARDIOGRAM COMPLETE: CPT | Performed by: INTERNAL MEDICINE

## 2019-07-17 ENCOUNTER — TRANSCRIBE ORDERS (OUTPATIENT)
Dept: ADMINISTRATIVE | Facility: HOSPITAL | Age: 74
End: 2019-07-17

## 2019-07-17 ENCOUNTER — APPOINTMENT (OUTPATIENT)
Dept: LAB | Facility: MEDICAL CENTER | Age: 74
End: 2019-07-17
Payer: MEDICARE

## 2019-07-17 DIAGNOSIS — Z00.8 HEALTH EXAMINATION IN POPULATION SURVEY: Primary | ICD-10-CM

## 2019-07-17 DIAGNOSIS — E78.5 DYSLIPIDEMIA: ICD-10-CM

## 2019-07-17 DIAGNOSIS — I10 ESSENTIAL HYPERTENSION: ICD-10-CM

## 2019-07-17 DIAGNOSIS — Z12.5 SCREENING PSA (PROSTATE SPECIFIC ANTIGEN): ICD-10-CM

## 2019-07-17 DIAGNOSIS — E78.2 MIXED HYPERLIPIDEMIA: ICD-10-CM

## 2019-07-17 DIAGNOSIS — I48.19 PERSISTENT ATRIAL FIBRILLATION (HCC): ICD-10-CM

## 2019-07-17 LAB
ALBUMIN SERPL BCP-MCNC: 4.1 G/DL (ref 3.5–5)
ALP SERPL-CCNC: 64 U/L (ref 46–116)
ALT SERPL W P-5'-P-CCNC: 26 U/L (ref 12–78)
ANION GAP SERPL CALCULATED.3IONS-SCNC: 2 MMOL/L (ref 4–13)
AST SERPL W P-5'-P-CCNC: 21 U/L (ref 5–45)
BASOPHILS # BLD AUTO: 0.06 THOUSANDS/ΜL (ref 0–0.1)
BASOPHILS NFR BLD AUTO: 1 % (ref 0–1)
BILIRUB SERPL-MCNC: 1.08 MG/DL (ref 0.2–1)
BUN SERPL-MCNC: 17 MG/DL (ref 5–25)
CALCIUM SERPL-MCNC: 9.7 MG/DL (ref 8.3–10.1)
CHLORIDE SERPL-SCNC: 103 MMOL/L (ref 100–108)
CHOLEST SERPL-MCNC: 183 MG/DL (ref 50–200)
CO2 SERPL-SCNC: 30 MMOL/L (ref 21–32)
CREAT SERPL-MCNC: 0.93 MG/DL (ref 0.6–1.3)
EOSINOPHIL # BLD AUTO: 0.2 THOUSAND/ΜL (ref 0–0.61)
EOSINOPHIL NFR BLD AUTO: 3 % (ref 0–6)
ERYTHROCYTE [DISTWIDTH] IN BLOOD BY AUTOMATED COUNT: 12.4 % (ref 11.6–15.1)
EST. AVERAGE GLUCOSE BLD GHB EST-MCNC: 111 MG/DL
GFR SERPL CREATININE-BSD FRML MDRD: 82 ML/MIN/1.73SQ M
GLUCOSE P FAST SERPL-MCNC: 101 MG/DL (ref 65–99)
HBA1C MFR BLD: 5.5 % (ref 4.2–6.3)
HCT VFR BLD AUTO: 43.4 % (ref 36.5–49.3)
HDLC SERPL-MCNC: 52 MG/DL (ref 40–60)
HGB BLD-MCNC: 14.4 G/DL (ref 12–17)
IMM GRANULOCYTES # BLD AUTO: 0.02 THOUSAND/UL (ref 0–0.2)
IMM GRANULOCYTES NFR BLD AUTO: 0 % (ref 0–2)
LDLC SERPL CALC-MCNC: 114 MG/DL (ref 0–100)
LYMPHOCYTES # BLD AUTO: 1.88 THOUSANDS/ΜL (ref 0.6–4.47)
LYMPHOCYTES NFR BLD AUTO: 29 % (ref 14–44)
MCH RBC QN AUTO: 30.3 PG (ref 26.8–34.3)
MCHC RBC AUTO-ENTMCNC: 33.2 G/DL (ref 31.4–37.4)
MCV RBC AUTO: 91 FL (ref 82–98)
MONOCYTES # BLD AUTO: 0.8 THOUSAND/ΜL (ref 0.17–1.22)
MONOCYTES NFR BLD AUTO: 12 % (ref 4–12)
NEUTROPHILS # BLD AUTO: 3.5 THOUSANDS/ΜL (ref 1.85–7.62)
NEUTS SEG NFR BLD AUTO: 55 % (ref 43–75)
NRBC BLD AUTO-RTO: 0 /100 WBCS
PLATELET # BLD AUTO: 256 THOUSANDS/UL (ref 149–390)
PMV BLD AUTO: 12.4 FL (ref 8.9–12.7)
POTASSIUM SERPL-SCNC: 4 MMOL/L (ref 3.5–5.3)
PROT SERPL-MCNC: 8 G/DL (ref 6.4–8.2)
PSA SERPL-MCNC: 2.3 NG/ML (ref 0–4)
RBC # BLD AUTO: 4.76 MILLION/UL (ref 3.88–5.62)
SODIUM SERPL-SCNC: 135 MMOL/L (ref 136–145)
TRIGL SERPL-MCNC: 84 MG/DL
WBC # BLD AUTO: 6.46 THOUSAND/UL (ref 4.31–10.16)

## 2019-07-17 PROCEDURE — G0103 PSA SCREENING: HCPCS | Performed by: INTERNAL MEDICINE

## 2019-07-17 PROCEDURE — 80053 COMPREHEN METABOLIC PANEL: CPT | Performed by: INTERNAL MEDICINE

## 2019-07-17 PROCEDURE — 85025 COMPLETE CBC W/AUTO DIFF WBC: CPT | Performed by: INTERNAL MEDICINE

## 2019-07-17 PROCEDURE — 80061 LIPID PANEL: CPT

## 2019-07-17 PROCEDURE — 83036 HEMOGLOBIN GLYCOSYLATED A1C: CPT | Performed by: INTERNAL MEDICINE

## 2019-07-17 PROCEDURE — 36415 COLL VENOUS BLD VENIPUNCTURE: CPT | Performed by: INTERNAL MEDICINE

## 2019-07-25 ENCOUNTER — PROCEDURE VISIT (OUTPATIENT)
Dept: CARDIOLOGY CLINIC | Facility: MEDICAL CENTER | Age: 74
End: 2019-07-25
Payer: MEDICARE

## 2019-07-25 ENCOUNTER — HOSPITAL ENCOUNTER (OUTPATIENT)
Dept: NON INVASIVE DIAGNOSTICS | Facility: MEDICAL CENTER | Age: 74
Discharge: HOME | End: 2019-07-25
Payer: MEDICARE

## 2019-07-25 DIAGNOSIS — I48.19 PERSISTENT ATRIAL FIBRILLATION (HCC): Primary | ICD-10-CM

## 2019-07-25 DIAGNOSIS — Z95.2 H/O PROSTHETIC AORTIC VALVE REPLACEMENT: ICD-10-CM

## 2019-07-25 DIAGNOSIS — I10 ESSENTIAL HYPERTENSION: ICD-10-CM

## 2019-07-25 DIAGNOSIS — I48.19 PERSISTENT ATRIAL FIBRILLATION (HCC): ICD-10-CM

## 2019-07-25 PROCEDURE — 93306 TTE W/DOPPLER COMPLETE: CPT | Performed by: INTERNAL MEDICINE

## 2019-07-25 PROCEDURE — 93306 TTE W/DOPPLER COMPLETE: CPT

## 2019-07-25 PROCEDURE — 93224 XTRNL ECG REC UP TO 48 HRS: CPT | Performed by: INTERNAL MEDICINE

## 2019-07-30 ENCOUNTER — HOSPITAL ENCOUNTER (OUTPATIENT)
Dept: NON INVASIVE DIAGNOSTICS | Facility: CLINIC | Age: 74
Discharge: HOME | End: 2019-07-30
Payer: MEDICARE

## 2019-07-30 DIAGNOSIS — I48.19 PERSISTENT ATRIAL FIBRILLATION (HCC): ICD-10-CM

## 2019-07-30 PROCEDURE — 93225 XTRNL ECG REC<48 HRS REC: CPT

## 2019-07-30 PROCEDURE — 93226 XTRNL ECG REC<48 HR SCAN A/R: CPT

## 2019-07-30 PROCEDURE — 93227 XTRNL ECG REC<48 HR R&I: CPT | Performed by: INTERNAL MEDICINE

## 2020-01-17 DIAGNOSIS — I15.9 SECONDARY HYPERTENSION: ICD-10-CM

## 2020-01-17 DIAGNOSIS — I48.19 PERSISTENT ATRIAL FIBRILLATION (HCC): ICD-10-CM

## 2020-01-17 RX ORDER — CANDESARTAN CILEXETIL AND HYDROCHLOROTHIAZIDE 32; 12.5 MG/1; MG/1
1 TABLET ORAL DAILY
Qty: 90 TABLET | Refills: 3 | Status: SHIPPED | OUTPATIENT
Start: 2020-01-17 | End: 2021-01-18 | Stop reason: SDUPTHER

## 2020-08-18 ENCOUNTER — OFFICE VISIT (OUTPATIENT)
Dept: CARDIOLOGY CLINIC | Facility: MEDICAL CENTER | Age: 75
End: 2020-08-18
Payer: MEDICARE

## 2020-08-18 VITALS
BODY MASS INDEX: 36.21 KG/M2 | HEIGHT: 67 IN | OXYGEN SATURATION: 98 % | WEIGHT: 230.7 LBS | DIASTOLIC BLOOD PRESSURE: 70 MMHG | SYSTOLIC BLOOD PRESSURE: 134 MMHG | HEART RATE: 70 BPM

## 2020-08-18 DIAGNOSIS — I63.9 CARDIOEMBOLIC STROKE (HCC): ICD-10-CM

## 2020-08-18 DIAGNOSIS — Z95.2 H/O PROSTHETIC AORTIC VALVE REPLACEMENT: ICD-10-CM

## 2020-08-18 DIAGNOSIS — I10 ESSENTIAL HYPERTENSION: ICD-10-CM

## 2020-08-18 DIAGNOSIS — E78.5 DYSLIPIDEMIA: ICD-10-CM

## 2020-08-18 DIAGNOSIS — I48.19 PERSISTENT ATRIAL FIBRILLATION (HCC): Primary | ICD-10-CM

## 2020-08-18 PROCEDURE — 99214 OFFICE O/P EST MOD 30 MIN: CPT | Performed by: INTERNAL MEDICINE

## 2020-08-18 PROCEDURE — 93000 ELECTROCARDIOGRAM COMPLETE: CPT | Performed by: INTERNAL MEDICINE

## 2020-08-18 NOTE — PROGRESS NOTES
Cardiology Follow Up    Sung Bautista  1945  [id number]  [facility] [place] [id number] [address]  [phone]    1  Persistent atrial fibrillation (HCC)  POCT ECG   2  Cardioembolic stroke ([address] )     3  Essential hypertension  POCT ECG   4  H/O prosthetic aortic valve replacement     5  Dyslipidemia         Discussion: He is doing well  Blood pressure and rate control are in the desired range  He is scheduled to undergo colonoscopy  There are no cardiac contraindications  He will hold rivaroxaban on the night prior to the procedure and resume it on the night of the procedure, as in the past  I asked him to return for follow-up visit in one year  No testing was ordered  Cardiovascular History:  Mr Vladimir Uribe underwent bioprosthetic AVR in 2007 for a bicuspid AoV  He did not have significant CAD  Bioprosthetic valve function has been normal by echocardiography in 2016 and 2019, with a mean gradient of 21  LV systolic function has been normal and he is been without symptoms of ischemic disease or heart failure  In 2012 he suffered a stroke believed to be due to atrial fibrillation  He was not on any coagulation that time  He has recovered well from the stroke and is now on rivaroxaban  He has hypertension, well-controlled on medical therapy  He has dyslipidemia, on pravastatin  A lipid panel in 7/19 disclosed total cholesterol 183, , HDL 52, and triglycerides 84  He does not have diabetes and does not smoke  He has a history of multiple polyps and undergoes colonoscopy every 3 years  Since his stroke, colonoscopy has been performed with rivaroxaban held the night prior to the procedure      Patient Active Problem List   Diagnosis    Essential hypertension    [facility] [address] [facility]    H/O prosthetic aortic valve replacement    Persistent atrial fibrillation (HCC)    Dyslipidemia     Past Medical History:   Diagnosis Date    [facility] [address] [facility]     Cancer ([facility] [address] [facility]     Cardiac disease     Hypertension     Stroke [facility])      Social History     Socioeconomic History    Marital status: /Civil Union     Spouse name: Not on file    Number of children: Not on file    Years of education: Not on file    Highest education level: Not on file   Occupational History    Not on file   Social Needs    Financial resource strain: Not on file    Food insecurity     Worry: Not on file     Inability: Not on file   [employer] needs     Medical: Not on file     Non-medical: Not on file   Tobacco Use    Smoking status: Former Smoker     Years:      Types: Cigarettes     Last attempt to quit:      Years since quittin 6    Smokeless tobacco: Never Used   Substance and Sexual Activity    Alcohol use: No    Drug use: Not Currently    Sexual activity: Not on file   Lifestyle    Physical activity     Days per week: Not on file     Minutes per session: Not on file    Stress: Not on file   Relationships    Social connections     Talks on phone: Not on file     Gets together: Not on file     Attends Yazidi service: Not on file     Active member of club or organization: Not on file     Attends meetings of clubs or organizations: Not on file     Relationship status: Not on file    Intimate partner violence     Fear of current or ex partner: Not on file     Emotionally abused: Not on file     Physically abused: Not on file     Forced sexual activity: Not on file   Other Topics Concern    Not on file   Social History Narrative    Not on file      Family History   Problem Relation Age of Onset    Hypertension Neg Hx     Hyperlipidemia Neg Hx     Heart disease Neg Hx     Heart attack Neg Hx     Clotting disorder Neg Hx     Arrhythmia Neg Hx     Anemia Neg Hx     Asthma Neg Hx     Fainting Neg Hx     Heart failure Neg Hx      Past Surgical History:   Procedure Laterality Date    AORTIC VALVE SURGERY  10/30/2007    bovine pericardial heart valve    CARDIAC SURGERY      CATARACT EXTRACTION      CHOLECYSTECTOMY      COLONOSCOPY      EYE SURGERY      SINUS SURGERY      SPLENECTOMY, TOTAL         Current Outpatient Medications:     amoxicillin (AMOXIL) 500 mg capsule, Take 2,000 mg by mouth daily as needed (prior to dentist appt), Disp: , Rfl:     aspirin 81 MG tablet, Take 1 tablet by mouth daily, Disp: , Rfl:     candesartan-hydrochlorothiazide (ATACAND HCT) 32-12 5 MG per tablet, Take 1 tablet by mouth daily, Disp: 90 tablet, Rfl: 3    Multiple Vitamin Essential TABS, Take 1 tablet by mouth daily, Disp: , Rfl:     pravastatin (PRAVACHOL) 20 mg tablet, Take 1 tablet by mouth daily, Disp: , Rfl:     rivaroxaban (XARELTO) 20 mg tablet, Take 1 tablet (20 mg total) by mouth daily, Disp: 90 tablet, Rfl: 3    VITAMIN B COMPLEX-C CAPS, Take 1 capsule by mouth daily, Disp: , Rfl:   No Known Allergies    Labs:  No visits with results within 6 Month(s) from this visit  Latest known visit with results is:   Appointment on 07/17/2019   Component Date Value    Cholesterol 07/17/2019 183     Triglycerides 07/17/2019 84     HDL, Direct 07/17/2019 52     LDL Calculated 07/17/2019 114*     Imaging: No results found  Review of Systems:  Review of Systems   Constitution: Negative  HENT: Negative  Eyes: Negative  Cardiovascular: Negative  Respiratory: Negative  Endocrine: Negative  Hematologic/Lymphatic: Negative  Skin: Negative  Musculoskeletal: Negative  Gastrointestinal: Negative  Genitourinary: Negative  Neurological: Negative  Psychiatric/Behavioral: Negative  Allergic/Immunologic: Negative  All other systems reviewed and are negative        Vitals:    08/18/20 0856   BP: 134/70   Pulse: 70   SpO2: 98%     Weight (last 2 days)     Date/Time   Weight    08/18/20 0856   105 (230 7)              Physical Exam:  Physical Exam   Constitutional: He is oriented to person, place, and time  He appears well-developed and well-nourished  No distress  HENT:   Head: Normocephalic and atraumatic  Eyes: Conjunctivae and EOM are normal  No scleral icterus  Neck: Normal range of motion  Neck supple  No JVD present  No tracheal deviation present  Cardiovascular: Normal rate and intact distal pulses  Exam reveals no gallop and no friction rub  Murmur heard  Irregular rhythm, atrial fibrillation with good rate control, Gr 2/6 BRENDA   Pulmonary/Chest: Effort normal and breath sounds normal  No stridor  No respiratory distress  He has no wheezes  He has no rales  He exhibits no tenderness  Abdominal: Soft  Bowel sounds are normal  He exhibits no distension  There is no abdominal tenderness  Musculoskeletal: Normal range of motion  General: No tenderness or edema  Neurological: He is alert and oriented to person, place, and time  No cranial nerve deficit  Coordination normal    Skin: Skin is warm and dry  He is not diaphoretic  No erythema  Psychiatric: He has a normal mood and affect  His behavior is normal  Judgment and thought content normal    Vitals reviewed        Rosa Barrera MD

## 2020-08-21 ENCOUNTER — APPOINTMENT (OUTPATIENT)
Dept: LAB | Facility: MEDICAL CENTER | Age: 75
End: 2020-08-21
Payer: MEDICARE

## 2020-08-21 ENCOUNTER — TRANSCRIBE ORDERS (OUTPATIENT)
Dept: ADMINISTRATIVE | Facility: HOSPITAL | Age: 75
End: 2020-08-21

## 2020-08-21 DIAGNOSIS — Z00.00 ROUTINE GENERAL MEDICAL EXAMINATION AT A HEALTH CARE FACILITY: ICD-10-CM

## 2020-08-21 DIAGNOSIS — Z12.5 SCREENING PSA (PROSTATE SPECIFIC ANTIGEN): ICD-10-CM

## 2020-08-21 DIAGNOSIS — E78.2 MIXED HYPERLIPIDEMIA: Primary | ICD-10-CM

## 2020-08-21 DIAGNOSIS — R73.01 IMPAIRED FASTING GLUCOSE: ICD-10-CM

## 2020-08-21 LAB
ALBUMIN SERPL BCP-MCNC: 3.9 G/DL (ref 3.5–5)
ALP SERPL-CCNC: 66 U/L (ref 46–116)
ALT SERPL W P-5'-P-CCNC: 24 U/L (ref 12–78)
ANION GAP SERPL CALCULATED.3IONS-SCNC: 2 MMOL/L (ref 4–13)
AST SERPL W P-5'-P-CCNC: 21 U/L (ref 5–45)
BASOPHILS # BLD AUTO: 0.05 THOUSANDS/ΜL (ref 0–0.1)
BASOPHILS NFR BLD AUTO: 1 % (ref 0–1)
BILIRUB SERPL-MCNC: 1.13 MG/DL (ref 0.2–1)
BUN SERPL-MCNC: 16 MG/DL (ref 5–25)
CALCIUM SERPL-MCNC: 9.7 MG/DL (ref 8.3–10.1)
CHLORIDE SERPL-SCNC: 105 MMOL/L (ref 100–108)
CHOLEST SERPL-MCNC: 167 MG/DL (ref 50–200)
CO2 SERPL-SCNC: 33 MMOL/L (ref 21–32)
CREAT SERPL-MCNC: 0.91 MG/DL (ref 0.6–1.3)
EOSINOPHIL # BLD AUTO: 0.23 THOUSAND/ΜL (ref 0–0.61)
EOSINOPHIL NFR BLD AUTO: 3 % (ref 0–6)
ERYTHROCYTE [DISTWIDTH] IN BLOOD BY AUTOMATED COUNT: 12.5 % (ref 11.6–15.1)
EST. AVERAGE GLUCOSE BLD GHB EST-MCNC: 114 MG/DL
GFR SERPL CREATININE-BSD FRML MDRD: 83 ML/MIN/1.73SQ M
GLUCOSE P FAST SERPL-MCNC: 90 MG/DL (ref 65–99)
HBA1C MFR BLD: 5.6 %
HCT VFR BLD AUTO: 45 % (ref 36.5–49.3)
HDLC SERPL-MCNC: 59 MG/DL
HGB BLD-MCNC: 14.4 G/DL (ref 12–17)
IMM GRANULOCYTES # BLD AUTO: 0.03 THOUSAND/UL (ref 0–0.2)
IMM GRANULOCYTES NFR BLD AUTO: 0 % (ref 0–2)
LDLC SERPL CALC-MCNC: 97 MG/DL (ref 0–100)
LYMPHOCYTES # BLD AUTO: 2.34 THOUSANDS/ΜL (ref 0.6–4.47)
LYMPHOCYTES NFR BLD AUTO: 29 % (ref 14–44)
MCH RBC QN AUTO: 30.6 PG (ref 26.8–34.3)
MCHC RBC AUTO-ENTMCNC: 32 G/DL (ref 31.4–37.4)
MCV RBC AUTO: 96 FL (ref 82–98)
MONOCYTES # BLD AUTO: 1.2 THOUSAND/ΜL (ref 0.17–1.22)
MONOCYTES NFR BLD AUTO: 15 % (ref 4–12)
NEUTROPHILS # BLD AUTO: 4.28 THOUSANDS/ΜL (ref 1.85–7.62)
NEUTS SEG NFR BLD AUTO: 52 % (ref 43–75)
NONHDLC SERPL-MCNC: 108 MG/DL
NRBC BLD AUTO-RTO: 0 /100 WBCS
PLATELET # BLD AUTO: 254 THOUSANDS/UL (ref 149–390)
PMV BLD AUTO: 12.9 FL (ref 8.9–12.7)
POTASSIUM SERPL-SCNC: 4.2 MMOL/L (ref 3.5–5.3)
PROT SERPL-MCNC: 7.9 G/DL (ref 6.4–8.2)
PSA SERPL-MCNC: 2.8 NG/ML (ref 0–4)
RBC # BLD AUTO: 4.71 MILLION/UL (ref 3.88–5.62)
SODIUM SERPL-SCNC: 140 MMOL/L (ref 136–145)
TRIGL SERPL-MCNC: 56 MG/DL
WBC # BLD AUTO: 8.13 THOUSAND/UL (ref 4.31–10.16)

## 2020-08-21 PROCEDURE — 80053 COMPREHEN METABOLIC PANEL: CPT | Performed by: INTERNAL MEDICINE

## 2020-08-21 PROCEDURE — 80061 LIPID PANEL: CPT | Performed by: INTERNAL MEDICINE

## 2020-08-21 PROCEDURE — 36415 COLL VENOUS BLD VENIPUNCTURE: CPT | Performed by: INTERNAL MEDICINE

## 2020-08-21 PROCEDURE — G0103 PSA SCREENING: HCPCS | Performed by: INTERNAL MEDICINE

## 2020-08-21 PROCEDURE — 85025 COMPLETE CBC W/AUTO DIFF WBC: CPT | Performed by: INTERNAL MEDICINE

## 2020-08-21 PROCEDURE — 83036 HEMOGLOBIN GLYCOSYLATED A1C: CPT | Performed by: INTERNAL MEDICINE

## 2020-11-24 ENCOUNTER — PREPPED CHART (OUTPATIENT)
Dept: URBAN - METROPOLITAN AREA CLINIC 6 | Facility: CLINIC | Age: 75
End: 2020-11-24

## 2021-01-18 DIAGNOSIS — I15.9 SECONDARY HYPERTENSION: ICD-10-CM

## 2021-01-18 DIAGNOSIS — I48.19 PERSISTENT ATRIAL FIBRILLATION (HCC): ICD-10-CM

## 2021-01-18 RX ORDER — CANDESARTAN CILEXETIL AND HYDROCHLOROTHIAZIDE 32; 12.5 MG/1; MG/1
1 TABLET ORAL DAILY
Qty: 90 TABLET | Refills: 3 | Status: SHIPPED | OUTPATIENT
Start: 2021-01-18 | End: 2021-10-22 | Stop reason: SDUPTHER

## 2021-02-13 DIAGNOSIS — Z23 ENCOUNTER FOR IMMUNIZATION: ICD-10-CM

## 2021-02-17 ENCOUNTER — IMMUNIZATIONS (OUTPATIENT)
Dept: FAMILY MEDICINE CLINIC | Facility: HOSPITAL | Age: 76
End: 2021-02-17

## 2021-02-17 DIAGNOSIS — Z23 ENCOUNTER FOR IMMUNIZATION: Primary | ICD-10-CM

## 2021-02-17 PROCEDURE — 0001A SARS-COV-2 / COVID-19 MRNA VACCINE (PFIZER-BIONTECH) 30 MCG: CPT

## 2021-02-17 PROCEDURE — 91300 SARS-COV-2 / COVID-19 MRNA VACCINE (PFIZER-BIONTECH) 30 MCG: CPT

## 2021-03-09 ENCOUNTER — IMMUNIZATIONS (OUTPATIENT)
Dept: FAMILY MEDICINE CLINIC | Facility: HOSPITAL | Age: 76
End: 2021-03-09

## 2021-03-09 DIAGNOSIS — Z23 ENCOUNTER FOR IMMUNIZATION: Primary | ICD-10-CM

## 2021-03-09 PROCEDURE — 0002A SARS-COV-2 / COVID-19 MRNA VACCINE (PFIZER-BIONTECH) 30 MCG: CPT

## 2021-03-09 PROCEDURE — 91300 SARS-COV-2 / COVID-19 MRNA VACCINE (PFIZER-BIONTECH) 30 MCG: CPT

## 2021-08-13 ENCOUNTER — OFFICE VISIT (OUTPATIENT)
Dept: CARDIOLOGY CLINIC | Facility: MEDICAL CENTER | Age: 76
End: 2021-08-13
Payer: MEDICARE

## 2021-08-13 VITALS
DIASTOLIC BLOOD PRESSURE: 68 MMHG | OXYGEN SATURATION: 97 % | SYSTOLIC BLOOD PRESSURE: 128 MMHG | HEIGHT: 68 IN | BODY MASS INDEX: 34.71 KG/M2 | HEART RATE: 79 BPM | WEIGHT: 229 LBS

## 2021-08-13 DIAGNOSIS — E78.5 DYSLIPIDEMIA: ICD-10-CM

## 2021-08-13 DIAGNOSIS — E66.01 OBESITY, MORBID (HCC): ICD-10-CM

## 2021-08-13 DIAGNOSIS — I10 ESSENTIAL HYPERTENSION: ICD-10-CM

## 2021-08-13 DIAGNOSIS — I63.9 CARDIOEMBOLIC STROKE (HCC): ICD-10-CM

## 2021-08-13 DIAGNOSIS — I48.19 PERSISTENT ATRIAL FIBRILLATION (HCC): Primary | ICD-10-CM

## 2021-08-13 DIAGNOSIS — Z95.2 H/O PROSTHETIC AORTIC VALVE REPLACEMENT: ICD-10-CM

## 2021-08-13 PROCEDURE — 99214 OFFICE O/P EST MOD 30 MIN: CPT | Performed by: INTERNAL MEDICINE

## 2021-08-13 NOTE — PROGRESS NOTES
Cardiology Follow Up    Carlos Wang  1945  [id number]  [facility] [place]  [address] [place]  [address]  [phone]    No diagnosis found  Discussion: He has been doing well  He denied chest discomfort, dyspnea, palpitations and syncope  BP is well controlled  I did not make any changes in his regimen  I asked him to return for a follow-up visit in 1 year with a lipid panel before the visit  At the time of his next visit, we will arrange for an echo in 2023  Cardiovascular History:  Mr Shannan Cartagena underwent bioprosthetic AVR in 2007 for a bicuspid AoV  He did not have significant CAD  Bioprosthetic valve function has been normal by echocardiography in 2016 and 2019, with a mean gradient of 21  LV systolic function has been normal and he is been without symptoms of ischemic disease or heart failure       In 2012 he suffered a stroke believed to be due to atrial fibrillation  He was not on any coagulation that time  He has recovered well from the stroke and is now on rivaroxaban       He has hypertension, well-controlled on medical therapy  He has dyslipidemia, on pravastatin  A lipid panel in 7/19 disclosed total cholesterol 183, , HDL 52, and triglycerides 84  He does not have diabetes and does not smoke      He has a history of multiple polyps and undergoes colonoscopy every 3 years  Since his stroke, colonoscopy has been performed with rivaroxaban held the night prior to the procedure      Patient Active Problem List   Diagnosis    Essential hypertension    [facility] [address] [facility]    H/O prosthetic aortic valve replacement    Persistent atrial fibrillation (HCC)    Dyslipidemia     Past Medical History:   Diagnosis Date    Bl[facility] [address] [facility]     [facility]     Cardiac disease     Hypertension     Stroke [facility])      Social History     Socioeconomic History    Marital status: /Civil [employer]     Spouse name: Not on file    Number of children: Not on file    Years of education: Not on file    Highest education level: Not on file   Occupational History    Not on file   Tobacco Use    Smoking status: Former Smoker     Years:      Types: Cigarettes     Quit date:      Years since quittin 6    Smokeless tobacco: Never Used   Vaping Use    Vaping Use: Never used   Substance and Sexual Activity    Alcohol use: No    Drug use: Not Currently    Sexual activity: Not on file   Other Topics Concern    Not on file   Social History Narrative    Not on file     Social Determinants of Health     Financial Resource Strain:     Difficulty of Paying Living Expenses:    Food Insecurity:     Worried About [address] in the Last Year:     [address] in the Last Year:    Transportation Needs:     Lack of Transportation (Medical):      Lack of Transportation (Non-Medical):    Physical Activity:     Days of Exercise per Week:     Minutes of Exercise per Session:    Stress:     Feeling of Stress :    Social Connections:     Frequency of Communication with Friends and Family:     Frequency of Social Gatherings with Friends and Family:     Attends Voodoo Services:     Active Member of Clubs or Organizations:     Attends Club or Organization Meetings:     Marital Status:    Intimate Partner Violence:     Fear of Current or Ex-Partner:     Emotionally Abused:     Physically Abused:     Sexually Abused:       Family History   Problem Relation Age of Onset    Hypertension Neg Hx     Hyperlipidemia Neg Hx     Heart disease Neg Hx     Heart attack Neg Hx     Clotting disorder Neg Hx     Arrhythmia Neg Hx     Anemia Neg Hx     Asthma Neg Hx     Fainting Neg Hx     Heart failure Neg Hx      Past Surgical History:   Procedure Laterality Date    AORTIC VALVE SURGERY  10/30/2007    bovine pericardial heart valve    CARDIAC SURGERY      CATARACT EXTRACTION      CHOLECYSTECTOMY      COLONOSCOPY      EYE SURGERY      SINUS SURGERY      SPLENECTOMY, TOTAL         Current Outpatient Medications:     amoxicillin (AMOXIL) 500 mg capsule, Take 2,000 mg by mouth daily as needed (prior to dentist appt), Disp: , Rfl:     aspirin 81 MG tablet, Take 1 tablet by mouth daily, Disp: , Rfl:     candesartan-hydrochlorothiazide (Atacand HCT) 32-12 5 MG per tablet, Take 1 tablet by mouth daily, Disp: 90 tablet, Rfl: 3    Multiple Vitamin Essential TABS, Take 1 tablet by mouth daily, Disp: , Rfl:     pravastatin (PRAVACHOL) 20 mg tablet, Take 1 tablet by mouth daily, Disp: , Rfl:     rivaroxaban (Xarelto) 20 mg tablet, Take 1 tablet (20 mg total) by mouth daily, Disp: 90 tablet, Rfl: 3    VITAMIN B COMPLEX-C CAPS, Take 1 capsule by mouth daily, Disp: , Rfl:   No Known Allergies    Labs:  No visits with results within 6 Month(s) from this visit     Latest known visit with results is:   Transcribe Orders on 08/21/2020   Component Date Value    WBC 08/21/2020 8 13     RBC 08/21/2020 4 71     Hemoglobin 08/21/2020 14 4     Hematocrit 08/21/2020 45 0     MCV 08/21/2020 96     MCH 08/21/2020 30 6     MCHC 08/21/2020 32 0     RDW 08/21/2020 12 5     MPV 08/21/2020 12 9*    Platelets 92/79/4231 254     nRBC 08/21/2020 0     Neutrophils Relative 08/21/2020 52     Immat GRANS % 08/21/2020 0     Lymphocytes Relative 08/21/2020 29     Monocytes Relative 08/21/2020 15*    Eosinophils Relative 08/21/2020 3     Basophils Relative 08/21/2020 1     Neutrophils Absolute 08/21/2020 4 28     Immature Grans Absolute 08/21/2020 0 03     Lymphocytes Absolute 08/21/2020 2 34     Monocytes Absolute 08/21/2020 1 20     Eosinophils Absolute 08/21/2020 0 23     Basophils Absolute 08/21/2020 0 05     Sodium 08/21/2020 140     Potassium 08/21/2020 4 2     Chloride 08/21/2020 105     CO2 08/21/2020 33*    ANION GAP 08/21/2020 2*    BUN 08/21/2020 16     Creatinine 08/21/2020 0 91     Glucose, Fasting 08/21/2020 90     Calcium 08/21/2020 9 7     AST 08/21/2020 21     ALT 08/21/2020 24     Alkaline Phosphatase 08/21/2020 66     Total Protein 08/21/2020 7 9     Albumin 08/21/2020 3 9     Total Bilirubin 08/21/2020 1 13*    eGFR 08/21/2020 83     Cholesterol 08/21/2020 167     Triglycerides 08/21/2020 56     HDL, Direct 08/21/2020 59     LDL Calculated 08/21/2020 97     Non-HDL-Chol (CHOL-HDL) 08/21/2020 108     PSA 08/21/2020 2 8     Hemoglobin A1C 08/21/2020 5 6     EAG 08/21/2020 114      Imaging: No results found  Review of Systems:  Review of Systems   Constitutional: Negative  HENT: Negative  Eyes: Negative  Cardiovascular: Negative  Respiratory: Negative  Endocrine: Negative  Hematologic/Lymphatic: Negative  Skin: Negative  Musculoskeletal: Negative  Gastrointestinal: Negative  Genitourinary: Negative  Neurological: Negative  Psychiatric/Behavioral: Negative  Allergic/Immunologic: Negative  All other systems reviewed and are negative  There were no vitals filed for this visit  Weight (last 2 days)     None          Physical Exam:  Physical Exam  Vitals reviewed  Constitutional:       General: He is not in acute distress  Appearance: He is well-developed  He is not diaphoretic  HENT:      Head: Normocephalic and atraumatic  Eyes:      General: No scleral icterus  Conjunctiva/sclera: Conjunctivae normal    Neck:      Vascular: No JVD  Trachea: No tracheal deviation  Cardiovascular:      Rate and Rhythm: Normal rate and regular rhythm  Pulses: Intact distal pulses  Heart sounds: Murmur heard  No friction rub  No gallop  Pulmonary:      Effort: Pulmonary effort is normal  No respiratory distress  Breath sounds: Normal breath sounds  No stridor  No wheezing or rales  Comments: Gr 2 BRENDA base  Chest:      Chest wall: No tenderness     Abdominal:      General: Bowel sounds are normal  There is no distension  Palpations: Abdomen is soft  Tenderness: There is no abdominal tenderness  Musculoskeletal:         General: No tenderness  Normal range of motion  Cervical back: Normal range of motion and neck supple  Skin:     General: Skin is warm and dry  Findings: No erythema  Neurological:      Mental Status: He is alert and oriented to person, place, and time  Cranial Nerves: No cranial nerve deficit  Coordination: Coordination normal    Psychiatric:         Behavior: Behavior normal          Thought Content:  Thought content normal          Judgment: Judgment normal          Poncho Jimenez MD

## 2021-10-05 ENCOUNTER — APPOINTMENT (OUTPATIENT)
Dept: LAB | Facility: MEDICAL CENTER | Age: 76
End: 2021-10-05
Payer: MEDICARE

## 2021-10-05 DIAGNOSIS — E78.5 DYSLIPIDEMIA: ICD-10-CM

## 2021-10-05 LAB
CHOLEST SERPL-MCNC: 184 MG/DL (ref 50–200)
HDLC SERPL-MCNC: 52 MG/DL
LDLC SERPL CALC-MCNC: 112 MG/DL (ref 0–100)
TRIGL SERPL-MCNC: 101 MG/DL

## 2021-10-05 PROCEDURE — 80061 LIPID PANEL: CPT

## 2021-10-22 DIAGNOSIS — I15.9 SECONDARY HYPERTENSION: ICD-10-CM

## 2021-10-22 DIAGNOSIS — I48.19 PERSISTENT ATRIAL FIBRILLATION (HCC): ICD-10-CM

## 2021-10-22 RX ORDER — CANDESARTAN CILEXETIL AND HYDROCHLOROTHIAZIDE 32; 12.5 MG/1; MG/1
1 TABLET ORAL DAILY
Qty: 90 TABLET | Refills: 3 | Status: SHIPPED | OUTPATIENT
Start: 2021-10-22 | End: 2021-10-26 | Stop reason: SDUPTHER

## 2021-10-25 ENCOUNTER — TELEPHONE (OUTPATIENT)
Dept: CARDIOLOGY CLINIC | Facility: CLINIC | Age: 76
End: 2021-10-25

## 2021-10-26 DIAGNOSIS — I48.19 PERSISTENT ATRIAL FIBRILLATION (HCC): ICD-10-CM

## 2021-10-26 DIAGNOSIS — I15.9 SECONDARY HYPERTENSION: ICD-10-CM

## 2021-10-26 RX ORDER — CANDESARTAN CILEXETIL AND HYDROCHLOROTHIAZIDE 32; 12.5 MG/1; MG/1
1 TABLET ORAL DAILY
Qty: 90 TABLET | Refills: 3 | Status: SHIPPED | OUTPATIENT
Start: 2021-10-26

## 2021-11-06 ENCOUNTER — IMMUNIZATIONS (OUTPATIENT)
Dept: FAMILY MEDICINE CLINIC | Facility: HOSPITAL | Age: 76
End: 2021-11-06

## 2021-11-06 DIAGNOSIS — Z23 ENCOUNTER FOR IMMUNIZATION: Primary | ICD-10-CM

## 2021-11-06 PROCEDURE — 91300 COVID-19 PFIZER VACC 0.3 ML: CPT

## 2021-11-06 PROCEDURE — 0001A COVID-19 PFIZER VACC 0.3 ML: CPT

## 2021-12-01 ENCOUNTER — ESTABLISHED COMPREHENSIVE EXAM (OUTPATIENT)
Dept: URBAN - METROPOLITAN AREA CLINIC 6 | Facility: CLINIC | Age: 76
End: 2021-12-01

## 2021-12-01 DIAGNOSIS — Z96.1: ICD-10-CM

## 2021-12-01 DIAGNOSIS — H40.023: ICD-10-CM

## 2021-12-01 PROCEDURE — 92014 COMPRE OPH EXAM EST PT 1/>: CPT

## 2021-12-01 PROCEDURE — 92133 CPTRZD OPH DX IMG PST SGM ON: CPT

## 2021-12-01 PROCEDURE — G8427 DOCREV CUR MEDS BY ELIG CLIN: HCPCS

## 2021-12-01 PROCEDURE — 76514 ECHO EXAM OF EYE THICKNESS: CPT

## 2021-12-01 PROCEDURE — 1036F TOBACCO NON-USER: CPT

## 2021-12-01 ASSESSMENT — VISUAL ACUITY
OS_SC: 20/25-1
OD_SC: 20/20

## 2021-12-01 ASSESSMENT — TONOMETRY
OS_IOP_MMHG: 25
OD_IOP_MMHG: 26
OD_IOP_MMHG: 32
OS_IOP_MMHG: 28
OS_IOP_MMHG: 24
OD_IOP_MMHG: 35

## 2021-12-01 ASSESSMENT — PACHYMETRY
OD_CT_UM: 585
OS_CT_UM: 598

## 2021-12-15 ENCOUNTER — APPOINTMENT (OUTPATIENT)
Dept: LAB | Facility: MEDICAL CENTER | Age: 76
End: 2021-12-15
Payer: MEDICARE

## 2021-12-15 DIAGNOSIS — R97.20 ELEVATED PROSTATE SPECIFIC ANTIGEN (PSA): ICD-10-CM

## 2021-12-15 LAB — PSA SERPL-MCNC: 4.1 NG/ML (ref 0–4)

## 2021-12-15 PROCEDURE — 84153 ASSAY OF PSA TOTAL: CPT

## 2022-02-03 ENCOUNTER — FOLLOW UP (OUTPATIENT)
Dept: URBAN - METROPOLITAN AREA CLINIC 6 | Facility: CLINIC | Age: 77
End: 2022-02-03

## 2022-02-03 DIAGNOSIS — H40.023: ICD-10-CM

## 2022-02-03 PROCEDURE — 92012 INTRM OPH EXAM EST PATIENT: CPT

## 2022-02-03 PROCEDURE — 92083 EXTENDED VISUAL FIELD XM: CPT

## 2022-02-03 ASSESSMENT — VISUAL ACUITY
OS_CC: 20/30-1
OD_CC: 20/25
OU_SC: J5

## 2022-02-03 ASSESSMENT — TONOMETRY
OD_IOP_MMHG: 36
OS_IOP_MMHG: 21
OD_IOP_MMHG: 33
OS_IOP_MMHG: 22

## 2022-02-28 ENCOUNTER — IOP CHECK (OUTPATIENT)
Dept: URBAN - METROPOLITAN AREA CLINIC 6 | Facility: CLINIC | Age: 77
End: 2022-02-28

## 2022-02-28 DIAGNOSIS — Z96.1: ICD-10-CM

## 2022-02-28 DIAGNOSIS — H40.023: ICD-10-CM

## 2022-02-28 PROCEDURE — 92012 INTRM OPH EXAM EST PATIENT: CPT

## 2022-02-28 ASSESSMENT — VISUAL ACUITY
OS_SC: 20/30+1
OD_SC: 20/25-2
OU_SC: J3

## 2022-02-28 ASSESSMENT — TONOMETRY
OD_IOP_MMHG: 21
OS_IOP_MMHG: 23

## 2022-03-02 ENCOUNTER — APPOINTMENT (OUTPATIENT)
Dept: LAB | Facility: MEDICAL CENTER | Age: 77
End: 2022-03-02
Payer: MEDICARE

## 2022-03-02 DIAGNOSIS — R97.20 ELEVATED PROSTATE SPECIFIC ANTIGEN (PSA): ICD-10-CM

## 2022-03-02 LAB — PSA SERPL-MCNC: 4.1 NG/ML (ref 0–4)

## 2022-03-02 PROCEDURE — 84153 ASSAY OF PSA TOTAL: CPT

## 2022-04-09 ENCOUNTER — IMMUNIZATIONS (OUTPATIENT)
Dept: FAMILY MEDICINE CLINIC | Facility: HOSPITAL | Age: 77
End: 2022-04-09

## 2022-04-09 PROCEDURE — 91305 COVID-19 PFIZER VACC TRIS-SUCROSE GRAY CAP 0.3 ML: CPT

## 2022-04-09 PROCEDURE — 0054A COVID-19 PFIZER VACC TRIS-SUCROSE GRAY CAP 0.3 ML: CPT

## 2022-06-09 ENCOUNTER — APPOINTMENT (OUTPATIENT)
Dept: LAB | Facility: CLINIC | Age: 77
End: 2022-06-09
Payer: MEDICARE

## 2022-06-09 DIAGNOSIS — R97.20 ELEVATED PSA: ICD-10-CM

## 2022-06-09 LAB — PSA SERPL-MCNC: 3.7 NG/ML (ref 0–4)

## 2022-06-09 PROCEDURE — 84153 ASSAY OF PSA TOTAL: CPT

## 2022-07-20 ENCOUNTER — IOP CHECK (OUTPATIENT)
Dept: URBAN - METROPOLITAN AREA CLINIC 6 | Facility: CLINIC | Age: 77
End: 2022-07-20

## 2022-07-20 DIAGNOSIS — H40.023: ICD-10-CM

## 2022-07-20 DIAGNOSIS — H04.123: ICD-10-CM

## 2022-07-20 DIAGNOSIS — Z96.1: ICD-10-CM

## 2022-07-20 PROCEDURE — 92020 GONIOSCOPY: CPT

## 2022-07-20 PROCEDURE — 92012 INTRM OPH EXAM EST PATIENT: CPT

## 2022-07-20 ASSESSMENT — TONOMETRY
OD_IOP_MMHG: 23
OS_IOP_MMHG: 19

## 2022-07-20 ASSESSMENT — VISUAL ACUITY
OS_SC: 20/30+1
OU_CC: J1+
OD_SC: 20/25-2

## 2022-09-19 DIAGNOSIS — I15.9 SECONDARY HYPERTENSION: ICD-10-CM

## 2022-09-19 DIAGNOSIS — I48.19 PERSISTENT ATRIAL FIBRILLATION (HCC): ICD-10-CM

## 2022-09-23 ENCOUNTER — OFFICE VISIT (OUTPATIENT)
Dept: CARDIOLOGY CLINIC | Facility: CLINIC | Age: 77
End: 2022-09-23
Payer: MEDICARE

## 2022-09-23 VITALS
DIASTOLIC BLOOD PRESSURE: 74 MMHG | HEART RATE: 70 BPM | SYSTOLIC BLOOD PRESSURE: 136 MMHG | BODY MASS INDEX: 33.58 KG/M2 | WEIGHT: 217.6 LBS

## 2022-09-23 DIAGNOSIS — I48.19 PERSISTENT ATRIAL FIBRILLATION (HCC): ICD-10-CM

## 2022-09-23 DIAGNOSIS — E78.5 DYSLIPIDEMIA: ICD-10-CM

## 2022-09-23 DIAGNOSIS — I63.9 CARDIOEMBOLIC STROKE (HCC): ICD-10-CM

## 2022-09-23 DIAGNOSIS — Z95.2 H/O PROSTHETIC AORTIC VALVE REPLACEMENT: Primary | ICD-10-CM

## 2022-09-23 DIAGNOSIS — I10 ESSENTIAL HYPERTENSION: ICD-10-CM

## 2022-09-23 DIAGNOSIS — Q23.1 BICUSPID AORTIC VALVE: ICD-10-CM

## 2022-09-23 PROBLEM — Q23.81 BICUSPID AORTIC VALVE: Status: ACTIVE | Noted: 2022-09-23

## 2022-09-23 PROCEDURE — 93000 ELECTROCARDIOGRAM COMPLETE: CPT | Performed by: INTERNAL MEDICINE

## 2022-09-23 PROCEDURE — 99214 OFFICE O/P EST MOD 30 MIN: CPT | Performed by: INTERNAL MEDICINE

## 2022-09-23 RX ORDER — LOSARTAN POTASSIUM AND HYDROCHLOROTHIAZIDE 25; 100 MG/1; MG/1
TABLET ORAL DAILY
COMMUNITY
Start: 2022-08-19

## 2022-09-23 NOTE — PROGRESS NOTES
Cardiology Follow Up    Marcelo Leroy  1945  [id number]  [address] [facility] [address] [phone]    1  H/O prosthetic aortic valve replacement  Echo complete w/ contrast if indicated    CT chest without contrast   2  Essential hypertension  POCT ECG   3  Persistent atrial fibrillation (HCC)     4  Cardioembolic stroke ([address] )     5  Dyslipidemia     6  Bicuspid aortic valve  CT chest without contrast       Discussion/Summary:    1  History of aortic stenosis, status post bioprosthetic aortic valve replacement 2007 - This was by report due to a bicuspid aortic valve  His last echocardiogram over 3 years ago did show some mildly elevated gradients but an overall normally functioning valve  We ordered an updated echocardiogram today  We also ordered a CT scan of the chest to survey his aorta given his bicuspid aortic valve history  He knows to take antibiotic prophylaxis for dental procedures  We will see him back in 1 year  2   Chronic atrial fibrillation - His heart rates have been well controlled without the need of AV nir blockers  He is asymptomatic from this standpoint  He had a prior embolic CVA off anticoagulation in 2012  He remains compliant on Xarelto  3   Hypertension - This is controlled on losartan/HCTZ  He does periodically check his blood pressure at home  4   Dyslipidemia - This is for the most part controlled on pravastatin  He continues to have blood work followed closely by his PCP  Interval History:    Mr Sienna Horvath comes in for follow-up given his cardiac history  In 2007 he had a bioprosthetic aortic valve replacement given aortic stenosis associated with a bicuspid aortic valve  Preoperatively he did not have any significant CAD by cardiac catheterization    He also had a cardioembolic CVA in 8661 in the setting of atrial fibrillation and not on anticoagulation, and in recent years has been in persistent atrial fibrillation  He has been on Xarelto over the last few years  He is also treated for hypertension and dyslipidemia  His last echocardiogram was in 2019 that did show a normally functioning valve with mildly elevated gradients, mean gradient around 20 mmHg  There was no significant AI  His atrial fibrillation has been rate controlled  He is not on rate-controlling agents, he did wear a Holter monitor few years back that showed an average heart rate in the 60s  He was last seen about a year ago  Overall Lance De La Rosa is felt well and is asymptomatic from a cardiac standpoint  He denies chest pains or any symptoms of angina  He does not feel his atrial fibrillation  No palpitations, lightheadedness or syncope  He denies any signs or symptoms of CHF  No shortness of breath  He goes for walks on a regular basis without any limitations or symptoms        Patient Active Problem List   Diagnosis    Essential hypertension    Cardioembolic stroke ([place] [address] )    H/O prosthetic aortic valve replacement    Persistent atrial fibrillation (HCC)    Dyslipidemia    Obesity, morbid ([place] [address] )    Bicuspid aortic valve     Past Medical History:   Diagnosis Date    Bladder cancer ([place] [address] )     Cancer ([place] [address] )     Cardiac disease     Hypertension     Stroke ([place] [address] )      Social History     Socioeconomic History    Marital status: /Civil Union     Spouse name: Not on file    Number of children: Not on file    Years of education: Not on file    Highest education level: Not on file   Occupational History    Not on file   Tobacco Use    Smoking status: Former Smoker     Years: 25      Types: Cigarettes     Quit date:      Years since quittin 7    Smokeless tobacco: Never Used   Vaping Use    Vaping Use: Never used   Substance and Sexual Activity    Alcohol use: No    Drug use: Not Currently    Sexual activity: Not on file   Other Topics Concern    Not on file   Social History Narrative    Not on file     Social Determinants of Health     Financial Resource Strain: Not on file   Food Insecurity: Not on file   Transportation Needs: Not on file   Physical Activity: Not on file   Stress: Not on file   Social Connections: Not on file   Intimate Partner Violence: Not on file   Housing Stability: Not on file      Family History   Problem Relation Age of Onset    Hypertension Neg Hx     Hyperlipidemia Neg Hx     Heart disease Neg Hx     Heart attack Neg Hx     Clotting disorder Neg Hx     Arrhythmia Neg Hx     Anemia Neg Hx     Asthma Neg Hx     Fainting Neg Hx     Heart failure Neg Hx      Past Surgical History:   Procedure Laterality Date    AORTIC VALVE SURGERY  10/30/2007    bovine pericardial heart valve    CARDIAC SURGERY      CATARACT EXTRACTION      CHOLECYSTECTOMY      COLONOSCOPY      EYE SURGERY      SINUS SURGERY      SPLENECTOMY, TOTAL         Current Outpatient Medications:     amoxicillin (AMOXIL) 500 mg capsule, Take 2,000 mg by mouth daily as needed (prior to dentist appt), Disp: , Rfl:     aspirin 81 MG tablet, Take 1 tablet by mouth daily, Disp: , Rfl:     losartan-hydrochlorothiazide (HYZAAR) 100-25 MG per tablet, Take by mouth daily, Disp: , Rfl:     Multiple Vitamin Essential TABS, Take 1 tablet by mouth daily, Disp: , Rfl:     pravastatin (PRAVACHOL) 20 mg tablet, Take 1 tablet by mouth daily, Disp: , Rfl:     rivaroxaban (Xarelto) 20 mg tablet, Take 1 tablet (20 mg total) by mouth daily, Disp: 90 tablet, Rfl: 0    VITAMIN B COMPLEX-C CAPS, Take 1 capsule by mouth daily, Disp: , Rfl:     candesartan-hydrochlorothiazide (Atacand HCT) 32-12 5 MG per tablet, Take 1 tablet by mouth daily (Patient not taking: Reported on 9/23/2022), Disp: 90 tablet, Rfl: 3  No Known Allergies    Labs:  Lab Results   Component Value Date    K 4 7 10/05/2021     10/05/2021    CO2 30 10/05/2021    BUN 17 10/05/2021 CREATININE 0 98 10/05/2021    CALCIUM 10 2 (H) 10/05/2021     Lab Results   Component Value Date    WBC 6 77 10/05/2021    HGB 14 9 10/05/2021    HCT 44 0 10/05/2021    MCV 91 10/05/2021     10/05/2021     Lab Results   Component Value Date    TRIG 101 10/05/2021    HDL 52 10/05/2021     Imaging:  ECG today shows atrial fibrillation with nonspecific interventricular conduction block  ECHO (7/2019):  LEFT VENTRICLE:  Systolic function was normal  Ejection fraction was estimated to be 65 %  There were no regional wall motion abnormalities  Wall thickness was mildly increased  Transmitral flow pattern: atrial fibrillation      LEFT ATRIUM:  The atrium was mildly dilated      RIGHT ATRIUM:  The atrium was mildly dilated      MITRAL VALVE:  There was moderate annular calcification  There was mild regurgitation      AORTIC VALVE:  A bioprosthesis was present  It exhibited normal function  There was possibly mild stenosis, however valve size is not provided and this could be normal for a smaller sized valve  There was no significant regurgitation  Valve mean gradient was 21 mmHg  Review of Systems:  Review of Systems   Constitutional: Negative  HENT: Negative  Eyes: Negative  Respiratory: Negative  Cardiovascular: Negative  Gastrointestinal: Negative  Musculoskeletal: Negative  Skin: Negative  Allergic/Immunologic: Negative  Neurological: Negative  Hematological: Negative  Psychiatric/Behavioral: Negative  All other systems reviewed and are negative  Vitals:    09/23/22 0938   BP: 136/74   BP Location: Right arm   Patient Position: Sitting   Cuff Size: Large   Pulse: 70   Weight: 98 7 kg (217 lb 9 6 oz)       Physical Exam:  Physical Exam  Vitals and nursing note reviewed  Constitutional:       Appearance: He is well-developed  HENT:      Head: Normocephalic and atraumatic  Eyes:      General: No scleral icterus  Right eye: No discharge           Left eye: No discharge  Pupils: Pupils are equal, round, and reactive to light  Neck:      Thyroid: No thyromegaly  Vascular: No JVD  Cardiovascular:      Rate and Rhythm: Normal rate  Rhythm irregularly irregular  No extrasystoles are present  Pulses: Normal pulses  No decreased pulses  Heart sounds: S1 normal and S2 normal  Murmur heard  Systolic murmur is present with a grade of 3/6  No friction rub  No gallop  Pulmonary:      Effort: Pulmonary effort is normal  No respiratory distress  Breath sounds: Normal breath sounds  No wheezing or rales  Abdominal:      General: Bowel sounds are normal  There is no distension  Palpations: Abdomen is soft  Tenderness: There is no abdominal tenderness  Musculoskeletal:         General: No tenderness or deformity  Normal range of motion  Cervical back: Normal range of motion and neck supple  Skin:     General: Skin is warm and dry  Findings: No rash  Neurological:      Mental Status: He is alert and oriented to person, place, and time  Cranial Nerves: No cranial nerve deficit  Psychiatric:         Thought Content: Thought content normal          Judgment: Judgment normal        Counseling / Coordination of Care  Total office time spent today 25 minutes  Greater than 50% of total time was spent with the patient and / or family counseling and / or coordination of care

## 2022-10-27 ENCOUNTER — HOSPITAL ENCOUNTER (OUTPATIENT)
Dept: NON INVASIVE DIAGNOSTICS | Facility: CLINIC | Age: 77
Discharge: HOME | End: 2022-10-27
Payer: MEDICARE

## 2022-10-27 ENCOUNTER — HOSPITAL ENCOUNTER (OUTPATIENT)
Dept: CT IMAGING | Facility: HOSPITAL | Age: 77
Discharge: HOME | End: 2022-10-27
Attending: INTERNAL MEDICINE
Payer: MEDICARE

## 2022-10-27 VITALS
SYSTOLIC BLOOD PRESSURE: 136 MMHG | HEIGHT: 68 IN | WEIGHT: 217 LBS | BODY MASS INDEX: 32.89 KG/M2 | HEART RATE: 75 BPM | DIASTOLIC BLOOD PRESSURE: 74 MMHG

## 2022-10-27 DIAGNOSIS — Z95.2 H/O PROSTHETIC AORTIC VALVE REPLACEMENT: ICD-10-CM

## 2022-10-27 DIAGNOSIS — Q23.1 BICUSPID AORTIC VALVE: ICD-10-CM

## 2022-10-27 LAB
AORTIC ROOT: 2.8 CM
AORTIC VALVE MEAN VELOCITY: 25.2 M/S
APICAL FOUR CHAMBER EJECTION FRACTION: 54 %
ASCENDING AORTA: 3.3 CM
AV AREA BY CONTINUOUS VTI: 0.6 CM2
AV AREA PEAK VELOCITY: 0.6 CM2
AV LVOT MEAN GRADIENT: 2 MMHG
AV LVOT PEAK GRADIENT: 3 MMHG
AV MEAN GRADIENT: 28 MMHG
AV PEAK GRADIENT: 45 MMHG
AV VALVE AREA: 0.57 CM2
AV VELOCITY RATIO: 0.24
DOP CALC AO PEAK VEL: 3.36 M/S
DOP CALC AO VTI: 82.44 CM
DOP CALC LVOT AREA: 2.27 CM2
DOP CALC LVOT DIAMETER: 1.7 CM
DOP CALC LVOT PEAK VEL VTI: 20.82 CM
DOP CALC LVOT PEAK VEL: 0.82 M/S
DOP CALC LVOT STROKE INDEX: 21.8 ML/M2
DOP CALC LVOT STROKE VOLUME: 47.23
FRACTIONAL SHORTENING: 33 (ref 28–44)
INTERVENTRICULAR SEPTUM IN DIASTOLE (PARASTERNAL SHORT AXIS VIEW): 1 CM
INTERVENTRICULAR SEPTUM: 1 CM (ref 0.6–1.1)
LAAS-AP2: 30.4 CM2
LAAS-AP4: 25.6 CM2
LEFT ATRIUM SIZE: 4.4 CM
LEFT INTERNAL DIMENSION IN SYSTOLE: 3.1 CM (ref 2.1–4)
LEFT VENTRICULAR INTERNAL DIMENSION IN DIASTOLE: 4.6 CM (ref 3.5–6)
LEFT VENTRICULAR POSTERIOR WALL IN END DIASTOLE: 1.3 CM
LEFT VENTRICULAR STROKE VOLUME: 60 ML
LVSV (TEICH): 60 ML
RA PRESSURE ESTIMATED: 5 MMHG
RIGHT ATRIAL 2D VOLUME: 32 ML
RIGHT ATRIUM AREA SYSTOLE A4C: 14.4 CM2
RIGHT VENTRICLE ID DIMENSION: 3.5 CM
RV PSP: 42 MMHG
SL CV LEFT ATRIUM LENGTH A2C: 6.4 CM
SL CV LV EF: 55
SL CV PED ECHO LEFT VENTRICLE DIASTOLIC VOLUME (MOD BIPLANE) 2D: 98 ML
SL CV PED ECHO LEFT VENTRICLE SYSTOLIC VOLUME (MOD BIPLANE) 2D: 38 ML
TR MAX PG: 37 MMHG
TR PEAK VELOCITY: 3 M/S
TRICUSPID VALVE PEAK REGURGITATION VELOCITY: 3.03 M/S

## 2022-10-27 PROCEDURE — 93306 TTE W/DOPPLER COMPLETE: CPT

## 2022-10-27 PROCEDURE — 93306 TTE W/DOPPLER COMPLETE: CPT | Performed by: INTERNAL MEDICINE

## 2022-10-27 PROCEDURE — 71250 CT THORAX DX C-: CPT

## 2022-11-30 ENCOUNTER — OFFICE VISIT (OUTPATIENT)
Dept: CARDIOLOGY CLINIC | Facility: CLINIC | Age: 77
End: 2022-11-30

## 2022-11-30 VITALS
BODY MASS INDEX: 32.74 KG/M2 | HEIGHT: 68 IN | HEART RATE: 76 BPM | SYSTOLIC BLOOD PRESSURE: 138 MMHG | WEIGHT: 216 LBS | DIASTOLIC BLOOD PRESSURE: 76 MMHG | OXYGEN SATURATION: 99 %

## 2022-11-30 DIAGNOSIS — Q23.1 BICUSPID AORTIC VALVE: ICD-10-CM

## 2022-11-30 DIAGNOSIS — Z95.2 H/O PROSTHETIC AORTIC VALVE REPLACEMENT: Primary | ICD-10-CM

## 2022-11-30 DIAGNOSIS — E78.5 DYSLIPIDEMIA: ICD-10-CM

## 2022-11-30 DIAGNOSIS — I10 ESSENTIAL HYPERTENSION: ICD-10-CM

## 2022-11-30 DIAGNOSIS — I48.19 PERSISTENT ATRIAL FIBRILLATION (HCC): ICD-10-CM

## 2022-11-30 NOTE — PROGRESS NOTES
Cardiology Follow Up    Torri Bradford  1945  [id number]  [address] [facility] [address] [phone]    1  H/O prosthetic aortic valve replacement        2  Bicuspid aortic valve        3  Persistent atrial fibrillation ([address] )        4  Essential hypertension        5  Dyslipidemia            Discussion/Summary:    1  History of aortic stenosis, status post bioprosthetic aortic valve replacement 2007 - This was by report due to a bicuspid aortic valve  I went over with Kailyn Cote his wife his recent testing  His echocardiogram does show moderately elevated gradients but what appears to be an adequate functioning aortic valve replacement  We will continue to follow echoes regularly  His aorta is top normal and we will continue to follow this as well  I have asked him to speak to his PCP regarding the small pulmonary nodules, which may not be of any clinical significance  2   Chronic atrial fibrillation - His heart rates have been well controlled without the need of AV nir blockers  He is asymptomatic from this standpoint  He had a prior embolic CVA off anticoagulation in 2012  He remains compliant on Xarelto  3   Hypertension - This is controlled on losartan/HCTZ  He does periodically check his blood pressure at home  4   Dyslipidemia - This is for the most part controlled on pravastatin  He continues to have blood work followed closely by his PCP  Interval History:    Mr Rosie Guerrier comes in for follow-up given his cardiac history and to go over his recent testing  In 2007 he had a bioprosthetic aortic valve replacement given aortic stenosis associated with a bicuspid aortic valve  Preoperatively he did not have any significant CAD by cardiac catheterization    He also had a cardioembolic CVA in 3041 in the setting of atrial fibrillation and not on anticoagulation, and in recent years has been in persistent atrial fibrillation  He has been on Xarelto over the last few years  He is also treated for hypertension and dyslipidemia  Prior to his recent testing his last echo was in July of 2019 that did show a normally functioning valve with mildly elevated gradients, mean gradient around 20 mmHg  There was no significant AI  His atrial fibrillation has been rate controlled  He is not on rate-controlling agents, he did wear a Holter monitor few years back that showed an average heart rate in the 60s  At our visit in September I ordered an updated echocardiogram and a CT scan of the chest   Echocardiogram did show normal LV systolic function and a well-seated bioprosthetic aortic valve replacement with moderately elevated gradient  Mean gradient was 28 mmHg  No significant aortic regurgitation  CT scan showed a top normal ascending aorta similar to the echocardiogram   He did have small pulmonary nodules noted  Overall Ahsan Khan has felt well and is asymptomatic from a cardiac standpoint  He denies chest pains or any symptoms of angina  He does not feel his atrial fibrillation  No palpitations, lightheadedness or syncope  He denies any signs or symptoms of CHF  No shortness of breath  He goes for walks on a regular basis without any limitations or symptoms        Patient Active Problem List   Diagnosis   • Essential hypertension   • [facility] )   • H/O prosthetic aortic valve replacement   • Persistent atrial fibrillation (HCC)   • Dyslipidemia   • Obesity, morbid ([facility] )   • Bicuspid aortic valve     Past Medical History:   Diagnosis Date   • Bladder cancer ([facility] )    • Cancer ([facility] )    • Cardiac disease    • Hypertension    • Stroke [facility])      Social History     Socioeconomic History   • Marital status: /Civil Union     Spouse name: Not on file   • Number of children: Not on file   • Years of education: Not on file   • Highest education level: Not on file   Occupational History   • Not on file   Tobacco Use   • Smoking status: Former     Years: 25 00     Types: Cigarettes     Quit date:      Years since quittin 9   • Smokeless tobacco: Never   Vaping Use   • Vaping Use: Never used   Substance and Sexual Activity   • Alcohol use: No   • Drug use: Not Currently   • Sexual activity: Not on file   Other Topics Concern   • Not on file   Social History Narrative   • Not on file     Social Determinants of Health     Financial Resource Strain: Not on file   Food Insecurity: Not on file   Transportation Needs: Not on file   Physical Activity: Not on file   Stress: Not on file   Social Connections: Not on file   Intimate Partner Violence: Not on file   Housing Stability: Not on file      Family History   Problem Relation Age of Onset   • Hypertension Neg Hx    • Hyperlipidemia Neg Hx    • Heart disease Neg Hx    • Heart attack Neg Hx    • Clotting disorder Neg Hx    • Arrhythmia Neg Hx    • Anemia Neg Hx    • Asthma Neg Hx    • Fainting Neg Hx    • Heart failure Neg Hx      Past Surgical History:   Procedure Laterality Date   • AORTIC VALVE SURGERY  10/30/2007    bovine pericardial heart valve   • CARDIAC SURGERY     • CATARACT EXTRACTION     • CHOLECYSTECTOMY     • COLONOSCOPY     • EYE SURGERY     • SINUS SURGERY     • SPLENECTOMY, TOTAL         Current Outpatient Medications:   •  amoxicillin (AMOXIL) 500 mg capsule, Take 2,000 mg by mouth daily as needed (prior to dentist appt), Disp: , Rfl:   •  losartan-hydrochlorothiazide (HYZAAR) 100-25 MG per tablet, Take by mouth daily, Disp: , Rfl:   •  Multiple Vitamin Essential TABS, Take 1 tablet by mouth daily, Disp: , Rfl:   •  pravastatin (PRAVACHOL) 20 mg tablet, Take 1 tablet by mouth daily, Disp: , Rfl:   •  rivaroxaban (Xarelto) 20 mg tablet, Take 1 tablet (20 mg total) by mouth daily, Disp: 90 tablet, Rfl: 0  •  VITAMIN B COMPLEX-C CAPS, Take 1 capsule by mouth daily, Disp: , Rfl:   •  aspirin 81 MG tablet, Take 1 tablet by mouth daily, Disp: , Rfl:   •  candesartan-hydrochlorothiazide (Atacand HCT) 32-12 5 MG per tablet, Take 1 tablet by mouth daily (Patient not taking: Reported on 9/23/2022), Disp: 90 tablet, Rfl: 3  No Known Allergies    Labs:  Lab Results   Component Value Date    K 4 7 10/05/2021     10/05/2021    CO2 30 10/05/2021    BUN 17 10/05/2021    CREATININE 0 98 10/05/2021    CALCIUM 10 2 (H) 10/05/2021     Lab Results   Component Value Date    WBC 6 77 10/05/2021    HGB 14 9 10/05/2021    HCT 44 0 10/05/2021    MCV 91 10/05/2021     10/05/2021     Lab Results   Component Value Date    TRIG 101 10/05/2021    HDL 52 10/05/2021     Imaging:  ECG today shows atrial fibrillation with nonspecific interventricular conduction block  ECHO (10/27/2022): •  Left Ventricle: Left ventricular cavity size is normal  Wall thickness is normal  The left ventricular ejection fraction is 55%  Systolic function is normal  Wall motion is normal   •  Right Ventricle: Systolic function is mildly reduced  Abnormal tricuspid annular plane systolic excursion (TAPSE) < 1 7 cm  •  Left Atrium: The atrium is moderately dilated  •  Aortic Valve: There is a bioprosthetic valve  The prosthetic valve appears well-seated  There is no evidence of transvalvular regurgitation  The gradient recorded across the prosthetic aortic valve is above the expected range  The aortic valve peak velocity is 3 36 m/s  The aortic valve mean gradient is 28 mmHg  The dimensionless velocity index is 0 24   •  Mitral Valve: There is moderate annular calcification  •  Tricuspid Valve: There is mild regurgitation  The right ventricular systolic pressure is mildly elevated  The estimated right ventricular systolic pressure is 03 86 mmHg  Review of Systems:  Review of Systems   Constitutional: Negative  HENT: Negative  Eyes: Negative  Respiratory: Negative  Cardiovascular: Negative  Gastrointestinal: Negative  Musculoskeletal: Negative  Skin: Negative  Allergic/Immunologic: Negative  Neurological: Negative  Hematological: Negative  Psychiatric/Behavioral: Negative  All other systems reviewed and are negative  Vitals:    11/30/22 1118   BP: 138/76   BP Location: Right arm   Patient Position: Sitting   Cuff Size: Large   Pulse: 76   SpO2: 99%   Weight: 98 kg (216 lb)   Height: 5' 7 5" (1 715 m)       Physical Exam:  Physical Exam  Vitals and nursing note reviewed  Constitutional:       Appearance: He is well-developed  HENT:      Head: Normocephalic and atraumatic  Eyes:      General: No scleral icterus  Right eye: No discharge  Left eye: No discharge  Pupils: Pupils are equal, round, and reactive to light  Neck:      Thyroid: No thyromegaly  Vascular: No JVD  Cardiovascular:      Rate and Rhythm: Normal rate  Rhythm irregularly irregular  No extrasystoles are present  Pulses: Normal pulses  No decreased pulses  Heart sounds: S1 normal and S2 normal  Murmur heard  Systolic murmur is present with a grade of 3/6  No friction rub  No gallop  Pulmonary:      Effort: Pulmonary effort is normal  No respiratory distress  Breath sounds: Normal breath sounds  No wheezing or rales  Abdominal:      General: Bowel sounds are normal  There is no distension  Palpations: Abdomen is soft  Tenderness: There is no abdominal tenderness  Musculoskeletal:         General: No tenderness or deformity  Normal range of motion  Cervical back: Normal range of motion and neck supple  Skin:     General: Skin is warm and dry  Findings: No rash  Neurological:      Mental Status: He is alert and oriented to person, place, and time  Cranial Nerves: No cranial nerve deficit  Psychiatric:         Thought Content: Thought content normal          Judgment: Judgment normal        Counseling / Coordination of Care  Total office time spent today 25 minutes    Greater than 50% of total time was spent with the patient and / or family counseling and / or coordination of care

## 2022-12-08 DIAGNOSIS — I15.9 SECONDARY HYPERTENSION: ICD-10-CM

## 2022-12-08 DIAGNOSIS — I48.19 PERSISTENT ATRIAL FIBRILLATION (HCC): ICD-10-CM

## 2022-12-21 ENCOUNTER — ESTABLISHED COMPREHENSIVE EXAM (OUTPATIENT)
Dept: URBAN - METROPOLITAN AREA CLINIC 6 | Facility: CLINIC | Age: 77
End: 2022-12-21

## 2022-12-21 DIAGNOSIS — H40.023: ICD-10-CM

## 2022-12-21 PROCEDURE — 92014 COMPRE OPH EXAM EST PT 1/>: CPT

## 2022-12-21 PROCEDURE — 92133 CPTRZD OPH DX IMG PST SGM ON: CPT

## 2022-12-21 PROCEDURE — 92020 GONIOSCOPY: CPT

## 2022-12-21 PROCEDURE — 92202 OPSCPY EXTND ON/MAC DRAW: CPT

## 2022-12-21 ASSESSMENT — TONOMETRY
OS_IOP_MMHG: 27
OD_IOP_MMHG: 23

## 2022-12-21 ASSESSMENT — VISUAL ACUITY
OS_SC: 20/25
OD_SC: 20/20+1

## 2022-12-30 ENCOUNTER — APPOINTMENT (OUTPATIENT)
Dept: LAB | Facility: MEDICAL CENTER | Age: 77
End: 2022-12-30

## 2022-12-30 DIAGNOSIS — E78.5 HYPERLIPIDEMIA, UNSPECIFIED HYPERLIPIDEMIA TYPE: ICD-10-CM

## 2022-12-30 DIAGNOSIS — R97.20 ELEVATED PROSTATE SPECIFIC ANTIGEN (PSA): ICD-10-CM

## 2022-12-30 DIAGNOSIS — R73.01 IMPAIRED FASTING GLUCOSE: ICD-10-CM

## 2022-12-30 DIAGNOSIS — Z00.00 ROUTINE GENERAL MEDICAL EXAMINATION AT A HEALTH CARE FACILITY: ICD-10-CM

## 2022-12-30 LAB
ALBUMIN SERPL BCP-MCNC: 4 G/DL (ref 3.5–5)
ALP SERPL-CCNC: 63 U/L (ref 46–116)
ALT SERPL W P-5'-P-CCNC: 31 U/L (ref 12–78)
ANION GAP SERPL CALCULATED.3IONS-SCNC: 7 MMOL/L (ref 4–13)
AST SERPL W P-5'-P-CCNC: 26 U/L (ref 5–45)
BASOPHILS # BLD AUTO: 0.06 THOUSANDS/ÂΜL (ref 0–0.1)
BASOPHILS NFR BLD AUTO: 1 % (ref 0–1)
BILIRUB SERPL-MCNC: 1.07 MG/DL (ref 0.2–1)
BUN SERPL-MCNC: 21 MG/DL (ref 5–25)
CALCIUM SERPL-MCNC: 10.1 MG/DL (ref 8.3–10.1)
CHLORIDE SERPL-SCNC: 103 MMOL/L (ref 96–108)
CHOLEST SERPL-MCNC: 186 MG/DL
CO2 SERPL-SCNC: 29 MMOL/L (ref 21–32)
CREAT SERPL-MCNC: 0.94 MG/DL (ref 0.6–1.3)
EOSINOPHIL # BLD AUTO: 0.15 THOUSAND/ÂΜL (ref 0–0.61)
EOSINOPHIL NFR BLD AUTO: 2 % (ref 0–6)
ERYTHROCYTE [DISTWIDTH] IN BLOOD BY AUTOMATED COUNT: 12.5 % (ref 11.6–15.1)
EST. AVERAGE GLUCOSE BLD GHB EST-MCNC: 108 MG/DL
GFR SERPL CREATININE-BSD FRML MDRD: 78 ML/MIN/1.73SQ M
GLUCOSE P FAST SERPL-MCNC: 98 MG/DL (ref 65–99)
HBA1C MFR BLD: 5.4 %
HCT VFR BLD AUTO: 43 % (ref 36.5–49.3)
HDLC SERPL-MCNC: 58 MG/DL
HGB BLD-MCNC: 14.2 G/DL (ref 12–17)
IMM GRANULOCYTES # BLD AUTO: 0.02 THOUSAND/UL (ref 0–0.2)
IMM GRANULOCYTES NFR BLD AUTO: 0 % (ref 0–2)
LDLC SERPL CALC-MCNC: 116 MG/DL (ref 0–100)
LYMPHOCYTES # BLD AUTO: 1.91 THOUSANDS/ÂΜL (ref 0.6–4.47)
LYMPHOCYTES NFR BLD AUTO: 30 % (ref 14–44)
MCH RBC QN AUTO: 30.6 PG (ref 26.8–34.3)
MCHC RBC AUTO-ENTMCNC: 33 G/DL (ref 31.4–37.4)
MCV RBC AUTO: 93 FL (ref 82–98)
MONOCYTES # BLD AUTO: 0.64 THOUSAND/ÂΜL (ref 0.17–1.22)
MONOCYTES NFR BLD AUTO: 10 % (ref 4–12)
NEUTROPHILS # BLD AUTO: 3.64 THOUSANDS/ÂΜL (ref 1.85–7.62)
NEUTS SEG NFR BLD AUTO: 57 % (ref 43–75)
NONHDLC SERPL-MCNC: 128 MG/DL
NRBC BLD AUTO-RTO: 0 /100 WBCS
PLATELET # BLD AUTO: 264 THOUSANDS/UL (ref 149–390)
PMV BLD AUTO: 11.9 FL (ref 8.9–12.7)
POTASSIUM SERPL-SCNC: 3.9 MMOL/L (ref 3.5–5.3)
PROT SERPL-MCNC: 7.7 G/DL (ref 6.4–8.4)
PSA SERPL-MCNC: 4.3 NG/ML (ref 0–4)
RBC # BLD AUTO: 4.64 MILLION/UL (ref 3.88–5.62)
SODIUM SERPL-SCNC: 139 MMOL/L (ref 135–147)
TRIGL SERPL-MCNC: 62 MG/DL
WBC # BLD AUTO: 6.42 THOUSAND/UL (ref 4.31–10.16)

## 2023-01-10 DIAGNOSIS — I15.9 SECONDARY HYPERTENSION: ICD-10-CM

## 2023-01-10 DIAGNOSIS — I48.19 PERSISTENT ATRIAL FIBRILLATION (HCC): ICD-10-CM

## 2023-05-12 DIAGNOSIS — I48.19 PERSISTENT ATRIAL FIBRILLATION (HCC): ICD-10-CM

## 2023-05-12 DIAGNOSIS — I15.9 SECONDARY HYPERTENSION: ICD-10-CM

## 2023-06-05 ENCOUNTER — APPOINTMENT (OUTPATIENT)
Dept: LAB | Facility: MEDICAL CENTER | Age: 78
End: 2023-06-05
Payer: MEDICARE

## 2023-06-05 DIAGNOSIS — R97.20 ELEVATED PROSTATE SPECIFIC ANTIGEN (PSA): ICD-10-CM

## 2023-06-05 LAB — PSA SERPL-MCNC: 5.14 NG/ML (ref 0–4)

## 2023-06-05 PROCEDURE — 84153 ASSAY OF PSA TOTAL: CPT

## 2023-06-05 PROCEDURE — 36415 COLL VENOUS BLD VENIPUNCTURE: CPT

## 2023-06-21 ENCOUNTER — FOLLOW UP (OUTPATIENT)
Dept: URBAN - METROPOLITAN AREA CLINIC 6 | Facility: CLINIC | Age: 78
End: 2023-06-21

## 2023-06-21 DIAGNOSIS — H40.023: ICD-10-CM

## 2023-06-21 PROCEDURE — 92012 INTRM OPH EXAM EST PATIENT: CPT

## 2023-06-21 PROCEDURE — 92083 EXTENDED VISUAL FIELD XM: CPT

## 2023-06-21 ASSESSMENT — VISUAL ACUITY
OD_SC: 20/20-1
OS_SC: 20/20-2

## 2023-06-21 ASSESSMENT — TONOMETRY
OS_IOP_MMHG: 19
OD_IOP_MMHG: 23

## 2023-10-13 ENCOUNTER — OFFICE VISIT (OUTPATIENT)
Dept: CARDIOLOGY CLINIC | Facility: CLINIC | Age: 78
End: 2023-10-13
Payer: MEDICARE

## 2023-10-13 VITALS
HEIGHT: 68 IN | HEART RATE: 61 BPM | DIASTOLIC BLOOD PRESSURE: 66 MMHG | WEIGHT: 210 LBS | BODY MASS INDEX: 31.83 KG/M2 | SYSTOLIC BLOOD PRESSURE: 118 MMHG

## 2023-10-13 DIAGNOSIS — I63.9 CARDIOEMBOLIC STROKE (HCC): ICD-10-CM

## 2023-10-13 DIAGNOSIS — I48.19 PERSISTENT ATRIAL FIBRILLATION (HCC): Primary | ICD-10-CM

## 2023-10-13 DIAGNOSIS — I10 ESSENTIAL HYPERTENSION: ICD-10-CM

## 2023-10-13 DIAGNOSIS — Q23.1 BICUSPID AORTIC VALVE: ICD-10-CM

## 2023-10-13 DIAGNOSIS — Z95.2 H/O PROSTHETIC AORTIC VALVE REPLACEMENT: ICD-10-CM

## 2023-10-13 PROCEDURE — 99214 OFFICE O/P EST MOD 30 MIN: CPT | Performed by: INTERNAL MEDICINE

## 2023-10-13 PROCEDURE — 93000 ELECTROCARDIOGRAM COMPLETE: CPT | Performed by: INTERNAL MEDICINE

## 2023-10-13 NOTE — PROGRESS NOTES
Cardiology Follow Up    Lola Sees  1945  [id number]  [facility] [address]  [phone]    1. Persistent atrial fibrillation (HCC)  POCT ECG      2. Bicuspid aortic valve        3. H/O prosthetic aortic valve replacement        4. Essential hypertension        5. Cardioembolic stroke [facility])            Discussion/Summary:    1. History of aortic stenosis, status post bioprosthetic aortic valve replacement 2007 - This was by report due to a bicuspid aortic valve. I went over with Curt Servin his wife his recent testing. His echocardiogram does show moderately elevated gradients but what appears to be an adequate functioning aortic valve replacement. We will continue to follow echoes regularly. His aorta is top normal and we will continue to follow this as well. I have asked him to speak to his PCP regarding the small pulmonary nodules, which may not be of any clinical significance. 2.  Chronic atrial fibrillation - His heart rates have been well controlled without the need of AV nir blockers. He is asymptomatic from this standpoint. He had a prior embolic CVA off anticoagulation in 2012. He remains compliant on Xarelto. 3.  Hypertension - This is controlled on losartan/HCTZ. He does periodically check his blood pressure at home. 4.  Dyslipidemia - This is for the most part controlled on pravastatin. He continues to have blood work followed closely by his PCP. Interval History:    Mr. Meka Finch comes in for follow-up given his cardiac history and to go over his recent testing. In 2007 he had a bioprosthetic aortic valve replacement given aortic stenosis associated with a bicuspid aortic valve. Preoperatively he did not have any significant CAD by cardiac catheterization.   He also had a cardioembolic CVA in 1473 in the setting of atrial fibrillation and not on anticoagulation, and in recent years has been in persistent atrial fibrillation. He has been on Xarelto over the last few years. He is also treated for hypertension and dyslipidemia. His atrial fibrillation has been rate controlled. He is not on rate-controlling agents, he did wear a Holter monitor few years back that showed an average heart rate in the 60s. Over the last few years we have noticed increased gradients across his prosthetic aortic valve. At our visit in September I ordered an updated echocardiogram and a CT scan of the chest.  These showed normal LV systolic function and a well-seated bioprosthetic aortic valve replacement with moderately elevated gradient. Mean gradient was 28 mmHg. No significant aortic regurgitation. CT scan showed a top normal ascending aorta similar to the echocardiogram.  He did have small pulmonary nodules noted. Overall Kike Salas has felt well and is asymptomatic from a cardiac standpoint. He denies chest pains or any symptoms of angina. He does not feel his atrial fibrillation. No palpitations, lightheadedness or syncope. He denies any signs or symptoms of CHF. No shortness of breath. He goes for walks on a regular basis without any limitations or symptoms.       Patient Active Problem List   Diagnosis    Essential hypertension    Cardioembolic stroke ([address])    H/O prosthetic aortic valve replacement    Persistent atrial fibrillation (HCC)    Dyslipidemia    Obesity, morbid (HCC)    Bicuspid aortic valve     Past Medical History:   Diagnosis Date    Bladder cancer ([address])     Cancer ([address])     Cardiac disease     Hypertension     Stroke [facility])      Social History     Socioeconomic History    Marital status: /Civil Union     Spouse name: Not on file    Number of children: Not on file    Years of education: Not on file    Highest education level: Not on file   Occupational History    Not on file   Tobacco Use    Smoking status: Former     Years: 25.00     Types: Cigarettes Quit date: 12     Years since quittin.8    Smokeless tobacco: Never   Vaping Use    Vaping Use: Never used   Substance and Sexual Activity    Alcohol use: No    Drug use: Not Currently    Sexual activity: Not on file   Other Topics Concern    Not on file   Social History Narrative    Not on file     Social Determinants of Health     Financial Resource Strain: Not on file   Food Insecurity: Not on file   Transportation Needs: Not on file   Physical Activity: Not on file   Stress: Not on file   Social Connections: Not on file   Intimate Partner Violence: Not on file   Housing Stability: Not on file      Family History   Problem Relation Age of Onset    Hypertension Neg Hx     Hyperlipidemia Neg Hx     Heart disease Neg Hx     Heart attack Neg Hx     Clotting disorder Neg Hx     Arrhythmia Neg Hx     Anemia Neg Hx     Asthma Neg Hx     Fainting Neg Hx     Heart failure Neg Hx      Past Surgical History:   Procedure Laterality Date    AORTIC VALVE SURGERY  10/30/2007    bovine pericardial heart valve    CARDIAC SURGERY      CATARACT EXTRACTION      CHOLECYSTECTOMY      COLONOSCOPY      EYE SURGERY      SINUS SURGERY      SPLENECTOMY, TOTAL         Current Outpatient Medications:     amoxicillin (AMOXIL) 500 mg capsule, Take 2,000 mg by mouth daily as needed (prior to dentist appt), Disp: , Rfl:     losartan-hydrochlorothiazide (HYZAAR) 100-25 MG per tablet, Take by mouth daily, Disp: , Rfl:     Multiple Vitamin Essential TABS, Take 1 tablet by mouth daily, Disp: , Rfl:     pravastatin (PRAVACHOL) 20 mg tablet, Take 1 tablet by mouth daily, Disp: , Rfl:     rivaroxaban (Xarelto) 20 mg tablet, Take 1 tablet (20 mg total) by mouth daily, Disp: 90 tablet, Rfl: 1    VITAMIN B COMPLEX-C CAPS, Take 1 capsule by mouth daily, Disp: , Rfl:     aspirin 81 MG tablet, Take 1 tablet by mouth daily, Disp: , Rfl:     candesartan-hydrochlorothiazide (Atacand HCT) 32-12.5 MG per tablet, Take 1 tablet by mouth daily (Patient not taking: Reported on 9/23/2022), Disp: 90 tablet, Rfl: 3  No Known Allergies    Labs:  Lab Results   Component Value Date    K 3.9 12/30/2022     12/30/2022    CO2 29 12/30/2022    BUN 21 12/30/2022    CREATININE 0.94 12/30/2022    CALCIUM 10.1 12/30/2022     Lab Results   Component Value Date    WBC 6.42 12/30/2022    HGB 14.2 12/30/2022    HCT 43.0 12/30/2022    MCV 93 12/30/2022     12/30/2022     Lab Results   Component Value Date    TRIG 62 12/30/2022    HDL 58 12/30/2022     Imaging:  ECG today shows atrial fibrillation with nonspecific interventricular conduction block. ECHO (10/27/2022):    Left Ventricle: Left ventricular cavity size is normal. Wall thickness is normal. The left ventricular ejection fraction is 55%. Systolic function is normal. Wall motion is normal.    Right Ventricle: Systolic function is mildly reduced. Abnormal tricuspid annular plane systolic excursion (TAPSE) < 1.7 cm. Left Atrium: The atrium is moderately dilated. Aortic Valve: There is a bioprosthetic valve. The prosthetic valve appears well-seated. There is no evidence of transvalvular regurgitation. The gradient recorded across the prosthetic aortic valve is above the expected range. The aortic valve peak velocity is 3.36 m/s. The aortic valve mean gradient is 28 mmHg. The dimensionless velocity index is 0.24. Mitral Valve: There is moderate annular calcification. Tricuspid Valve: There is mild regurgitation. The right ventricular systolic pressure is mildly elevated. The estimated right ventricular systolic pressure is 12.16 mmHg. Review of Systems:  Review of Systems   Constitutional: Negative. HENT: Negative. Eyes: Negative. Respiratory: Negative. Cardiovascular: Negative. Gastrointestinal: Negative. Musculoskeletal: Negative. Skin: Negative. Allergic/Immunologic: Negative. Neurological: Negative. Hematological: Negative. Psychiatric/Behavioral: Negative. All other systems reviewed and are negative. Vitals:    10/13/23 1020   BP: 118/66   BP Location: Right arm   Patient Position: Sitting   Cuff Size: Large   Pulse: 61   Weight: 95.3 kg (210 lb)   Height: 5' 7.5" (1.715 m)       Physical Exam:  Physical Exam  Vitals and nursing note reviewed. Constitutional:       Appearance: He is well-developed. HENT:      Head: Normocephalic and atraumatic. Eyes:      General: No scleral icterus. Right eye: No discharge. Left eye: No discharge. Pupils: Pupils are equal, round, and reactive to light. Neck:      Thyroid: No thyromegaly. Vascular: No JVD. Cardiovascular:      Rate and Rhythm: Normal rate. Rhythm irregularly irregular. No extrasystoles are present. Pulses: Normal pulses. No decreased pulses. Heart sounds: S1 normal and S2 normal. Murmur heard. Systolic murmur is present with a grade of 3/6. No friction rub. No gallop. Pulmonary:      Effort: Pulmonary effort is normal. No respiratory distress. Breath sounds: Normal breath sounds. No wheezing or rales. Abdominal:      General: Bowel sounds are normal. There is no distension. Palpations: Abdomen is soft. Tenderness: There is no abdominal tenderness. Musculoskeletal:         General: No tenderness or deformity. Normal range of motion. Cervical back: Normal range of motion and neck supple. Skin:     General: Skin is warm and dry. Findings: No rash. Neurological:      Mental Status: He is alert and oriented to person, place, and time. Cranial Nerves: No cranial nerve deficit. Psychiatric:         Thought Content: Thought content normal.         Judgment: Judgment normal.       Counseling / Coordination of Care  Total office time spent today 25 minutes. Greater than 50% of total time was spent with the patient and / or family counseling and / or coordination of care.

## 2023-11-13 DIAGNOSIS — I48.19 PERSISTENT ATRIAL FIBRILLATION (HCC): ICD-10-CM

## 2023-11-13 DIAGNOSIS — I15.9 SECONDARY HYPERTENSION: ICD-10-CM

## 2023-12-06 ENCOUNTER — ESTABLISHED COMPREHENSIVE EXAM (OUTPATIENT)
Dept: URBAN - METROPOLITAN AREA CLINIC 6 | Facility: CLINIC | Age: 78
End: 2023-12-06

## 2023-12-06 DIAGNOSIS — H40.023: ICD-10-CM

## 2023-12-06 DIAGNOSIS — H04.123: ICD-10-CM

## 2023-12-06 DIAGNOSIS — Z96.1: ICD-10-CM

## 2023-12-06 PROCEDURE — 92014 COMPRE OPH EXAM EST PT 1/>: CPT

## 2023-12-06 PROCEDURE — 92020 GONIOSCOPY: CPT

## 2023-12-06 PROCEDURE — 92133 CPTRZD OPH DX IMG PST SGM ON: CPT

## 2023-12-06 PROCEDURE — 92202 OPSCPY EXTND ON/MAC DRAW: CPT

## 2023-12-06 ASSESSMENT — TONOMETRY
OD_IOP_MMHG: 19
OS_IOP_MMHG: 20

## 2023-12-06 ASSESSMENT — VISUAL ACUITY
OD_SC: 20/25+2
OS_SC: 20/25+2

## 2023-12-12 ENCOUNTER — APPOINTMENT (OUTPATIENT)
Dept: LAB | Facility: MEDICAL CENTER | Age: 78
End: 2023-12-12
Payer: MEDICARE

## 2023-12-12 DIAGNOSIS — R97.20 ELEVATED PROSTATE SPECIFIC ANTIGEN (PSA): ICD-10-CM

## 2023-12-12 DIAGNOSIS — E78.5 HYPERLIPIDEMIA, UNSPECIFIED HYPERLIPIDEMIA TYPE: ICD-10-CM

## 2023-12-12 DIAGNOSIS — Z00.8 HEALTH EXAMINATION IN POPULATION SURVEY: ICD-10-CM

## 2023-12-12 LAB
ALBUMIN SERPL BCP-MCNC: 4.4 G/DL (ref 3.5–5)
ALP SERPL-CCNC: 55 U/L (ref 34–104)
ALT SERPL W P-5'-P-CCNC: 23 U/L (ref 7–52)
ANION GAP SERPL CALCULATED.3IONS-SCNC: 7 MMOL/L
AST SERPL W P-5'-P-CCNC: 27 U/L (ref 13–39)
BILIRUB SERPL-MCNC: 1.11 MG/DL (ref 0.2–1)
BUN SERPL-MCNC: 15 MG/DL (ref 5–25)
CALCIUM SERPL-MCNC: 10.1 MG/DL (ref 8.4–10.2)
CHLORIDE SERPL-SCNC: 100 MMOL/L (ref 96–108)
CHOLEST SERPL-MCNC: 183 MG/DL
CO2 SERPL-SCNC: 33 MMOL/L (ref 21–32)
CREAT SERPL-MCNC: 0.84 MG/DL (ref 0.6–1.3)
GFR SERPL CREATININE-BSD FRML MDRD: 84 ML/MIN/1.73SQ M
GLUCOSE P FAST SERPL-MCNC: 89 MG/DL (ref 65–99)
HDLC SERPL-MCNC: 54 MG/DL
LDLC SERPL CALC-MCNC: 113 MG/DL (ref 0–100)
NONHDLC SERPL-MCNC: 129 MG/DL
POTASSIUM SERPL-SCNC: 4 MMOL/L (ref 3.5–5.3)
PROT SERPL-MCNC: 7.4 G/DL (ref 6.4–8.4)
PSA SERPL-MCNC: 6.13 NG/ML (ref 0–4)
SODIUM SERPL-SCNC: 140 MMOL/L (ref 135–147)
TRIGL SERPL-MCNC: 78 MG/DL

## 2023-12-12 PROCEDURE — 36415 COLL VENOUS BLD VENIPUNCTURE: CPT

## 2023-12-12 PROCEDURE — 84153 ASSAY OF PSA TOTAL: CPT

## 2023-12-12 PROCEDURE — 80053 COMPREHEN METABOLIC PANEL: CPT

## 2023-12-12 PROCEDURE — 80061 LIPID PANEL: CPT

## 2024-01-31 ENCOUNTER — HOSPITAL ENCOUNTER (OUTPATIENT)
Dept: CT IMAGING | Facility: HOSPITAL | Age: 79
Discharge: HOME | End: 2024-01-31
Payer: MEDICARE

## 2024-01-31 DIAGNOSIS — R91.1 LUNG NODULE: ICD-10-CM

## 2024-01-31 PROCEDURE — 71250 CT THORAX DX C-: CPT

## 2024-01-31 PROCEDURE — G1004 CDSM NDSC: HCPCS

## 2024-03-27 ENCOUNTER — APPOINTMENT (OUTPATIENT)
Dept: LAB | Facility: MEDICAL CENTER | Age: 79
End: 2024-03-27
Payer: MEDICARE

## 2024-03-27 DIAGNOSIS — R97.20 ELEVATED PROSTATE SPECIFIC ANTIGEN (PSA): ICD-10-CM

## 2024-03-27 LAB — PSA SERPL-MCNC: 6.24 NG/ML (ref 0–4)

## 2024-03-27 PROCEDURE — 84153 ASSAY OF PSA TOTAL: CPT

## 2024-03-27 PROCEDURE — 36415 COLL VENOUS BLD VENIPUNCTURE: CPT

## 2024-04-27 DIAGNOSIS — I48.19 PERSISTENT ATRIAL FIBRILLATION (HCC): ICD-10-CM

## 2024-04-27 DIAGNOSIS — I15.9 SECONDARY HYPERTENSION: ICD-10-CM

## 2024-04-27 NOTE — TELEPHONE ENCOUNTER
Medication Refill Request     Name rivaroxaban (Xarelto) 20 mg tablet    Dose/Frequency     Take 1 tablet (20 mg total) by mouth daily     Quantity 90  Verified pharmacy   [x]  Verified ordering Provider   [x]  Does patient have enough for the next 3 days? Yes [x] No []

## 2024-05-16 ENCOUNTER — IOP CHECK (OUTPATIENT)
Dept: URBAN - METROPOLITAN AREA CLINIC 6 | Facility: CLINIC | Age: 79
End: 2024-05-16

## 2024-05-16 DIAGNOSIS — H04.123: ICD-10-CM

## 2024-05-16 DIAGNOSIS — Z96.1: ICD-10-CM

## 2024-05-16 DIAGNOSIS — H40.023: ICD-10-CM

## 2024-05-16 PROCEDURE — 92012 INTRM OPH EXAM EST PATIENT: CPT

## 2024-05-16 PROCEDURE — 92083 EXTENDED VISUAL FIELD XM: CPT

## 2024-05-16 ASSESSMENT — TONOMETRY
OS_IOP_MMHG: 18
OD_IOP_MMHG: 17

## 2024-05-16 ASSESSMENT — VISUAL ACUITY
OS_SC: 20/30
OD_SC: 20/25-2

## 2024-08-08 ENCOUNTER — APPOINTMENT (OUTPATIENT)
Dept: LAB | Facility: MEDICAL CENTER | Age: 79
End: 2024-08-08
Payer: MEDICARE

## 2024-08-08 DIAGNOSIS — R97.20 ELEVATED PSA: ICD-10-CM

## 2024-08-08 LAB — PSA SERPL-MCNC: 2.94 NG/ML (ref 0–4)

## 2024-08-08 PROCEDURE — 36415 COLL VENOUS BLD VENIPUNCTURE: CPT

## 2024-08-08 PROCEDURE — 84153 ASSAY OF PSA TOTAL: CPT

## 2024-09-24 DIAGNOSIS — I15.9 SECONDARY HYPERTENSION: ICD-10-CM

## 2024-09-24 DIAGNOSIS — I48.19 PERSISTENT ATRIAL FIBRILLATION (HCC): ICD-10-CM

## 2024-09-25 NOTE — TELEPHONE ENCOUNTER
Refill must be reviewed and completed by the office or provider. The refill is unable to be approved or denied by the medication management team.    Last seen 10.2023 and have an appt 06.2025, request for 980D supply to [facility] - Please review to see if the refill is appropriate.

## 2024-10-23 ENCOUNTER — OFFICE VISIT (OUTPATIENT)
Dept: CARDIOLOGY CLINIC | Facility: MEDICAL CENTER | Age: 79
End: 2024-10-23
Payer: MEDICARE

## 2024-10-23 VITALS
WEIGHT: 206 LBS | HEIGHT: 68 IN | OXYGEN SATURATION: 99 % | SYSTOLIC BLOOD PRESSURE: 132 MMHG | BODY MASS INDEX: 31.22 KG/M2 | DIASTOLIC BLOOD PRESSURE: 70 MMHG | HEART RATE: 87 BPM

## 2024-10-23 DIAGNOSIS — E78.5 DYSLIPIDEMIA: ICD-10-CM

## 2024-10-23 DIAGNOSIS — E66.01 OBESITY, MORBID (HCC): ICD-10-CM

## 2024-10-23 DIAGNOSIS — I63.9 CARDIOEMBOLIC STROKE (HCC): ICD-10-CM

## 2024-10-23 DIAGNOSIS — I10 ESSENTIAL HYPERTENSION: ICD-10-CM

## 2024-10-23 DIAGNOSIS — I48.19 PERSISTENT ATRIAL FIBRILLATION (HCC): Primary | ICD-10-CM

## 2024-10-23 DIAGNOSIS — Z95.2 H/O PROSTHETIC AORTIC VALVE REPLACEMENT: ICD-10-CM

## 2024-10-23 PROCEDURE — 99214 OFFICE O/P EST MOD 30 MIN: CPT | Performed by: STUDENT IN AN ORGANIZED HEALTH CARE EDUCATION/TRAINING PROGRAM

## 2024-10-23 PROCEDURE — 93000 ELECTROCARDIOGRAM COMPLETE: CPT | Performed by: STUDENT IN AN ORGANIZED HEALTH CARE EDUCATION/TRAINING PROGRAM

## 2024-10-23 RX ORDER — FINASTERIDE 5 MG/1
TABLET, FILM COATED ORAL
COMMUNITY
Start: 2024-09-13

## 2024-10-23 NOTE — ASSESSMENT & PLAN NOTE
Stable.  Does not have tachycardia even off of AV nir agents.  Patient reports that he has episodes where he feels like his heart rate is low.  However these occur very infrequently, less than once a month.  I asked him to please check his heart rate during this with a pulse oximeter.  I asked him to call the office if his heart rates are lower than 50s.  If he has symptoms and has a low heart rate, I urged him to go to the emergency room.  If these episodes do become more frequent, I discussed that I would like to order a Zio patch to monitor for symptomatic bradycardia.  However, he has no documented bradycardia at home.  -Continue Xarelto 20 mg daily

## 2024-10-23 NOTE — ASSESSMENT & PLAN NOTE
Status post bioprosthetic aortic valve replacement in 2007.  I reviewed his last transthoracic echocardiogram from 2022 which did show increased valve gradients and velocity.  -As he is more than 10 years out from his bioprosthetic valve replacement, we will plan to do yearly echocardiograms  -Echocardiogram ordered  -He is not taking aspirin due to history of frequent epistaxis (requiring cauterization) while on aspirin and Xarelto

## 2024-12-05 ENCOUNTER — ESTABLISHED COMPREHENSIVE EXAM (OUTPATIENT)
Dept: URBAN - METROPOLITAN AREA CLINIC 6 | Facility: CLINIC | Age: 79
End: 2024-12-05

## 2024-12-05 DIAGNOSIS — Z96.1: ICD-10-CM

## 2024-12-05 DIAGNOSIS — H04.123: ICD-10-CM

## 2024-12-05 DIAGNOSIS — H40.023: ICD-10-CM

## 2024-12-05 PROCEDURE — 92014 COMPRE OPH EXAM EST PT 1/>: CPT

## 2024-12-05 PROCEDURE — 92133 CPTRZD OPH DX IMG PST SGM ON: CPT

## 2024-12-05 PROCEDURE — 92020 GONIOSCOPY: CPT

## 2024-12-05 ASSESSMENT — TONOMETRY
OS_IOP_MMHG: 20
OD_IOP_MMHG: 19

## 2024-12-05 ASSESSMENT — VISUAL ACUITY
OS_SC: 20/30+2
OD_SC: 20/25

## 2024-12-12 DIAGNOSIS — I48.19 PERSISTENT ATRIAL FIBRILLATION (HCC): ICD-10-CM

## 2024-12-12 DIAGNOSIS — I15.9 SECONDARY HYPERTENSION: ICD-10-CM

## 2024-12-26 ENCOUNTER — HOSPITAL ENCOUNTER (OUTPATIENT)
Dept: NON INVASIVE DIAGNOSTICS | Facility: MEDICAL CENTER | Age: 79
Discharge: HOME | End: 2024-12-26
Payer: MEDICARE

## 2024-12-26 VITALS
HEIGHT: 68 IN | HEART RATE: 55 BPM | DIASTOLIC BLOOD PRESSURE: 70 MMHG | SYSTOLIC BLOOD PRESSURE: 132 MMHG | BODY MASS INDEX: 31.22 KG/M2 | WEIGHT: 206 LBS

## 2024-12-26 DIAGNOSIS — Z95.2 H/O PROSTHETIC AORTIC VALVE REPLACEMENT: ICD-10-CM

## 2024-12-26 LAB
AORTIC ROOT: 2.8 CM
AORTIC VALVE MEAN VELOCITY: 32.9 M/S
APICAL FOUR CHAMBER EJECTION FRACTION: 56 %
ASCENDING AORTA: 3.6 CM
AV AREA BY CONTINUOUS VTI: 0.5 CM2
AV AREA PEAK VELOCITY: 0.5 CM2
AV LVOT MEAN GRADIENT: 1 MMHG
AV LVOT PEAK GRADIENT: 2 MMHG
AV MEAN GRADIENT: 46 MMHG
AV PEAK GRADIENT: 69 MMHG
AV VALVE AREA: 0.5 CM2
AV VELOCITY RATIO: 0.17
BSA FOR ECHO PROCEDURE: 2.06 M2
DOP CALC AO PEAK VEL: 4.15 M/S
DOP CALC AO VTI: 93.5 CM
DOP CALC LVOT AREA: 2.83 CM2
DOP CALC LVOT CARDIAC INDEX: 1.62 L/MIN/M2
DOP CALC LVOT CARDIAC OUTPUT: 3.34 L/MIN
DOP CALC LVOT DIAMETER: 1.9 CM
DOP CALC LVOT PEAK VEL VTI: 16.56 CM
DOP CALC LVOT PEAK VEL: 0.69 M/S
DOP CALC LVOT STROKE INDEX: 22.8 ML/M2
DOP CALC LVOT STROKE VOLUME: 46.93
E WAVE DECELERATION TIME: 192 MS
E/A RATIO: 77
FRACTIONAL SHORTENING: 38 (ref 28–44)
INTERVENTRICULAR SEPTUM IN DIASTOLE (PARASTERNAL SHORT AXIS VIEW): 1 CM
INTERVENTRICULAR SEPTUM: 1 CM (ref 0.6–1.1)
LAAS-AP2: 33.4 CM2
LAAS-AP4: 27.6 CM2
LEFT ATRIUM AREA SYSTOLE SINGLE PLANE A4C: 27.3 CM2
LEFT ATRIUM SIZE: 4.5 CM
LEFT ATRIUM VOLUME (MOD BIPLANE): 111 ML
LEFT ATRIUM VOLUME INDEX (MOD BIPLANE): 53.9 ML/M2
LEFT INTERNAL DIMENSION IN SYSTOLE: 2.9 CM (ref 2.1–4)
LEFT VENTRICULAR INTERNAL DIMENSION IN DIASTOLE: 4.7 CM (ref 3.5–6)
LEFT VENTRICULAR POSTERIOR WALL IN END DIASTOLE: 1 CM
LEFT VENTRICULAR STROKE VOLUME: 72 ML
LVSV (TEICH): 72 ML
MV PEAK A VEL: 0.02 M/S
MV PEAK E VEL: 154 CM/S
MV STENOSIS PRESSURE HALF TIME: 56 MS
MV VALVE AREA P 1/2 METHOD: 3.93
RIGHT ATRIAL 2D VOLUME: 74 ML
RIGHT ATRIUM AREA SYSTOLE A4C: 22.1 CM2
RIGHT VENTRICLE ID DIMENSION: 4.6 CM
SL CV LEFT ATRIUM LENGTH A2C: 7.2 CM
SL CV LV EF: 65
SL CV PED ECHO LEFT VENTRICLE DIASTOLIC VOLUME (MOD BIPLANE) 2D: 105 ML
SL CV PED ECHO LEFT VENTRICLE SYSTOLIC VOLUME (MOD BIPLANE) 2D: 33 ML
TR MAX PG: 39 MMHG
TR PEAK VELOCITY: 3.1 M/S
TRICUSPID ANNULAR PLANE SYSTOLIC EXCURSION: 1.5 CM
TRICUSPID VALVE PEAK REGURGITATION VELOCITY: 3.12 M/S

## 2024-12-26 PROCEDURE — 93306 TTE W/DOPPLER COMPLETE: CPT | Performed by: STUDENT IN AN ORGANIZED HEALTH CARE EDUCATION/TRAINING PROGRAM

## 2024-12-26 PROCEDURE — 93306 TTE W/DOPPLER COMPLETE: CPT

## 2024-12-27 ENCOUNTER — RESULTS FOLLOW-UP (OUTPATIENT)
Dept: CARDIOLOGY CLINIC | Facility: CLINIC | Age: 79
End: 2024-12-27

## 2024-12-27 DIAGNOSIS — Z09 FOLLOW-UP VISIT FOR AORTIC VALVE REPLACEMENT WITH BIOPROSTHETIC VALVE: Primary | ICD-10-CM

## 2024-12-27 DIAGNOSIS — I35.0 SEVERE AORTIC STENOSIS: ICD-10-CM

## 2024-12-27 DIAGNOSIS — Z95.3 FOLLOW-UP VISIT FOR AORTIC VALVE REPLACEMENT WITH BIOPROSTHETIC VALVE: Primary | ICD-10-CM

## 2024-12-30 ENCOUNTER — TELEPHONE (OUTPATIENT)
Age: 79
End: 2024-12-30

## 2024-12-30 NOTE — TELEPHONE ENCOUNTER
Caller: Deirdre ( Spouse)     Doctor: Dr. Rosemary Deluca     Reason for call: Pt's wife Deirdre called & stated that Dr. Deluca would like pt to have a LORENA. Deirdre would like to know if this is covered by Medicare. Please call & Advise     Call back#: [phone]    Right Shoulder Total Arthroplasty (R) Operative Note     Date: 2023  OR Location: [facility] A OR    Name: Tomasz Maxwell, : 1972, Age: 50 y.o., MRN: [id number], Sex: male    Diagnosis  Pre-op Diagnosis     * Arthritis of right shoulder region [M19.011] Post-op Diagnosis     * Arthritis of right shoulder region [M19.011]     Procedures  Right Shoulder Total Arthroplasty  25245 - [place] ARTHROPLASTY GLENOHUMERAL JOINT TOTAL SHOULDER      Surgeons      * Devon Claros - Primary     * Alejandrina Chawla    Resident/Fellow/Other Assistant:  Surgeon(s) and Role:     * Anthony Rdz DO - Resident - Assisting     * Alejandrina Chawla PA-C    Procedure Summary  Anesthesia: General  ASA: III  Anesthesia Staff: Anesthesiologist: Nuha Nuñez MD  CRNA: Bonita Macias, APRN-CRNA; Viktoria Capone APRN-CRNA  Estimated Blood Loss:   50mL  Intra-op Medications:   Medication Name Total Dose   sodium chloride 0.9 % irrigation solution 1,000 mL   ceFAZolin (Ancef) injection 3 g 3 g   fentaNYL PF (Sublimaze) injection 25 mcg 100 mcg              Anesthesia Record               Intraprocedure I/O Totals          Intake    Ketamine 0.00 mL    The total shown is the total volume documented since Anesthesia Start was filed.    Propofol Drip 0.00 mL    The total shown is the total volume documented since Anesthesia Start was filed.    Total Intake 0 mL          Specimen: No specimens collected     Staff:   Circulator: Wilfred Carlin RN  Scrub Person: Marcus Quijano RN; Donal Rockwell         Drains and/or Catheters: * None in log *    Tourniquet Times:         Implants:  Implants       Type Name Action Serial No.      Joint GUIDE PIN, 3 X 75MM, STERILE - SN/A - [id number] Used, Not Implanted N/A     Implant CEMENT, BONE, SIMPLEX P, RADIOPAQUE, FULL DOSE, 40 GM - SN/A - [id number] Implanted N/A     Joint GLENOID, AEQUALIS, CORTILOC, PEGGED, MOD M40 - [id number] - [id number] Implanted [id number]     Joint SHOULDER SYSTEM, SIMPLICITI  NUCLEUS SIZE 2 - [id number] - [id number] Implanted [id number]     Joint HEAD, HUMERAL, SIMPLICITI STB, 44 X 18 - [id number] - [id number] Implanted [id number]              Findings:   Advanced degenerative arthritis right shoulder intact biceps tendon intact rotator cuff.    Indications: Tomasz Maxwell is an 50 y.o. male who is having surgery for Arthritis of right shoulder region [M19.011].   Patient has severe pain in his right shoulder and is unresponsive to nonoperative treatment he has moderate arthritis of the glenohumeral joint he was counseled about options for treatment including surgical and nonoperative options possible benefits alternatives and rehabilitation sequence required as well as the risks of surgery were explained and the patient request and schedule surgery    The patient was seen in the preoperative area. The risks, benefits, complications, treatment options, non-operative alternatives, expected recovery and outcomes were discussed with the patient. The possibilities of reaction to medication, pulmonary aspiration, injury to surrounding structures, bleeding, recurrent infection, the need for additional procedures, failure to diagnose a condition, and creating a complication requiring transfusion or operation were discussed with the patient. The patient concurred with the proposed plan, giving informed consent.  The site of surgery was properly noted/marked if necessary per policy. The patient has been actively warmed in preoperative area. Preoperative antibiotics have been ordered and given within 1 hours of incision. Venous thrombosis prophylaxis have been ordered including bilateral sequential compression devices    Procedure Details:   Patient was transferred to the operating placed supine on the operating table timeout was called the marked site confirmed patient identification confirmed procedure reviewed allergies reviewed and antibiotics administered general endotracheal  anesthesia staff successfully the patient was moved to semisitting position all bony prominences were padded sequential compression devices were placed on patient's legs for DVT prophylaxis right upper extremity sterilely prepped and draped incision was made over the deltoid brought through skin and subcutaneous tissues the deltopectoral interval was opened the patient had a massive deltoid and given the patient's large body habitus and body mass index of 45 exposure was difficult.  The deltoid was elevated off the subdeltoid bursa and the pectoralis major tendon retracted and the tendon was partially released at its insertion.  The conjoined tendon was identified and retracted and the axillary nerve protected with retractors for the remainder of the procedure the anterior humeral circumflex vessels were identified divided and cauterized the biceps tendon was tenodesed into the bicipital groove and anterior capsulotomy was performed and the capsule was dissected off the inferior humeral neck with progressive external rotation of the humerus capsule was retracted and the humeral cut was made at 135 degrees angle the humerus was subluxed posteriorly and retracted with great difficulty the glenoid was exposed meticulous dissection of the circumferential soft tissues the biceps anchor glenoid labrum were resected and inferior capsular release was performed releasing the insertion of the triceps.  The glenoid pin was placed and the glenoid was reamed and the central and peripheral drill holes were prepared polymethylmethacrylate bone cement was injected into the peripheral drill holes and the cartilage peg glenoid was impacted into place and seated securely the humerus was subluxed anteriorly and again exposure was extremely difficult of the humerus was prepared to receive Tornier simplicity shoulder size 2 nucleus and a STP humeral head 44 mm in diameter by 18 mm in thickness corresponding to the preoperative  measurements.  The implants were placed and after copious irrigation shoulder was reduced and found to have an excellent translation profile approximately 40% posterior translation easily achieved.  The subscapularis and capsule repaired anatomically with Ethibond sutures followed by closure of the deltopectoral interval with Vicryl sutures followed by closure of the subcutaneous tissue and skin with observable sutures and a sterile dressing was then applied patient's arm was placed in a sling patient was awakened and transferred to the recovery room there were no intraoperative complications please note that JOHN Jacob served as first assistant's note qualified senior resident was available for the case as well as a-2 2 modifier will be utilized given the patient's large body habitus and difficulty with exposure making this much more difficult and complex procedure than a typical shoulder arthroplasty.    Complications:  None; patient tolerated the procedure well.    Disposition: PACU - hemodynamically stable.  Condition: stable         Additional Details:   none    Attending Attestation:     Devon Claros  Phone Number: [phone]

## 2025-03-21 DIAGNOSIS — I48.19 PERSISTENT ATRIAL FIBRILLATION (HCC): ICD-10-CM

## 2025-03-21 DIAGNOSIS — I15.9 SECONDARY HYPERTENSION: ICD-10-CM

## 2025-04-14 ENCOUNTER — OFFICE VISIT (OUTPATIENT)
Dept: CARDIOLOGY CLINIC | Facility: MEDICAL CENTER | Age: 80
End: 2025-04-14
Payer: MEDICARE

## 2025-04-14 VITALS
WEIGHT: 199 LBS | OXYGEN SATURATION: 98 % | SYSTOLIC BLOOD PRESSURE: 126 MMHG | BODY MASS INDEX: 30.16 KG/M2 | HEART RATE: 79 BPM | DIASTOLIC BLOOD PRESSURE: 68 MMHG | HEIGHT: 68 IN

## 2025-04-14 DIAGNOSIS — I10 ESSENTIAL HYPERTENSION: ICD-10-CM

## 2025-04-14 DIAGNOSIS — Z95.2 H/O PROSTHETIC AORTIC VALVE REPLACEMENT: Primary | ICD-10-CM

## 2025-04-14 DIAGNOSIS — E78.5 DYSLIPIDEMIA: ICD-10-CM

## 2025-04-14 DIAGNOSIS — I63.9 CARDIOEMBOLIC STROKE (HCC): ICD-10-CM

## 2025-04-14 DIAGNOSIS — I48.19 PERSISTENT ATRIAL FIBRILLATION (HCC): ICD-10-CM

## 2025-04-14 PROCEDURE — 99214 OFFICE O/P EST MOD 30 MIN: CPT | Performed by: STUDENT IN AN ORGANIZED HEALTH CARE EDUCATION/TRAINING PROGRAM

## 2025-04-14 PROCEDURE — 93000 ELECTROCARDIOGRAM COMPLETE: CPT | Performed by: STUDENT IN AN ORGANIZED HEALTH CARE EDUCATION/TRAINING PROGRAM

## 2025-04-14 NOTE — PROGRESS NOTES
[facility] [place]  [address]  [address]  [facility] [address]  Phone#  [phone]  Fax#  [phone]  [facility]'s Cardiology Office Follow-up Visit             NAME: Toni Mattson  AGE: 79 y.o. SEX: male   : 1945   MRN: [id number]    DATE: 2025  TIME: 10:57 AM      Assessment & Plan    Persistent atrial fibrillation (HCC)  On chronic anticoagulation  Stable.  Does not have tachycardia even off of AV nir agents.  Patient reports that he has episodes where he feels like his heart rate is low.  However these occur very infrequently, less than once a month.  I asked him to please check his heart rate during this with a pulse oximeter.  I asked him to call the office if his heart rates are lower than 50s.  If he has symptoms and has a low heart rate, I urged him to go to the emergency room.  If these episodes do become more frequent, I discussed that I would like to order a Zio patch to monitor for symptomatic bradycardia.  However, he has no documented bradycardia at home.  Continue Xarelto 20 mg daily  Check CBC due to AC use    H/O prosthetic aortic valve replacement  Status post bioprosthetic aortic valve replacement in .   24 echo - There is a bioprosthetic aortic valve. 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 He has peak velocity across the valve of 4 m/s2, DVI 0.17, with increased acceleration time of 115 ms. Calculated YAJAIRA of 0.5 cm2.   As he is more than 10 years out from his bioprosthetic valve replacement, we will plan to do yearly echocardiograms  He is not taking aspirin due to history of frequent epistaxis (requiring cauterization) while on aspirin and Xarelto  LORENA ordered for evaluation of increased valve gradients    Dyslipidemia  Stable.  23 Lipid panel showed total cholesterol 183, HDL 54, TG 78,    Continue pravastatin 20 mg daily  Repeat lipid panel    Essential hypertension  Stable.  Blood pressure 126/68 today.     Continue losartan/HCTZ 100/25 mg daily    Cardioembolic stroke (HCC)  Continue Xarelto 20 mg daily    Follow up 3 mo for LORENA review      -----    Chief Complaint   Patient presents with    Follow-up     6 month f/up       HPI:    Toni Mattson is a 79 y.o.-year-old male who presents to the cardiology clinic for follow up for the above-listed problems.     History of aortic stenosis with bicuspid aortic valve, status post bioprosthetic aortic valve replacement in 2007.  He also has a history of persistent atrial fibrillation rate controlled off of AV nir agents, hypertension, prior cardioembolic stroke.    I initially saw him 10/23/24 for routine follow-up.  He has no concerns of chest pain, chest discomfort, or palpitations.  Denies dizziness.  Occasionally feels lightheaded when changing from a sitting to standing position.  Denies lightheadedness otherwise.  States that occasionally he will have a sensation where he feels like his heart rate get swallowing.  Is unable to describe this to me further.  Denies any chest pain or chest discomfort at that time.  Has not checked his heart rate during those times.    He denies chest pain, chest discomfort, shortness or breath at rest, dyspnea on exertion, edema, palpitations.     -----    I reviewed his transthoracic echocardiogram from 10/27/2022.  He had normal systolic function with an EF of 55%.  Left atrium was moderately dilated.  He had a bioprosthetic aortic valve that is well-seated.  His peak velocity was 3.36 m/s.  Mean gradient was 28 mmHg.  Dimensionless index 0.24.    12/26/24 echo - There is a bioprosthetic aortic valve. 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 He has peak velocity across the valve of 4 m/s2, DVI 0.17, with increased acceleration time of 115 ms. Calculated YAJAIRA of 0.5 cm2.     12/12/23 Lipid panel showed total cholesterol 183, HDL 54, TG 78,      Past history, family history, social history, current medications,  vital signs, recent lab and imaging studies and  prior cardiology studies reviewed independently on this visit.    No Known Allergies    Current Outpatient Medications:     finasteride (PROSCAR) 5 mg tablet, , Disp: , Rfl:     losartan-hydrochlorothiazide (HYZAAR) 100-25 MG per tablet, Take by mouth daily, Disp: , Rfl:     Multiple Vitamin Essential TABS, Take 1 tablet by mouth daily, Disp: , Rfl:     pravastatin (PRAVACHOL) 20 mg tablet, Take 1 tablet by mouth daily, Disp: , Rfl:     rivaroxaban (Xarelto) 20 mg tablet, Take 1 tablet (20 mg total) by mouth daily, Disp: 90 tablet, Rfl: 0    VITAMIN B COMPLEX-C CAPS, Take 1 capsule by mouth daily, Disp: , Rfl:     aspirin 81 MG tablet, Take 1 tablet by mouth daily, Disp: , Rfl:     Review of Systems   Constitutional:  Negative for fatigue.   Respiratory:  Negative for chest tightness and shortness of breath.    Cardiovascular:  Negative for chest pain, palpitations and leg swelling.       Objective:     Vitals:    04/14/25 1025   BP: 126/68   Pulse: 79   SpO2: 98%       Wt Readings from Last 3 Encounters:   04/14/25 90.3 kg (199 lb)   12/26/24 93.4 kg (206 lb)   10/23/24 93.4 kg (206 lb)     Pulse Readings from Last 3 Encounters:   04/14/25 79   12/26/24 55   10/23/24 87     BP Readings from Last 3 Encounters:   04/14/25 126/68   12/26/24 132/70   10/23/24 132/70     Physical Exam  Vitals and nursing note reviewed.   Constitutional:       General: He is not in acute distress.     Appearance: Normal appearance. He is well-developed.   HENT:      Head: Normocephalic and atraumatic.   Cardiovascular:      Rate and Rhythm: Normal rate. Rhythm irregularly irregular.      Heart sounds: S1 normal and S2 normal. Murmur (with preserved S2) heard.      Crescendo decrescendo systolic murmur is present with a grade of 3/6.   Pulmonary:      Effort: Pulmonary effort is normal. No respiratory distress.      Breath sounds: Normal breath sounds.   Abdominal:      General: There is no  "distension.   Musculoskeletal:         General: No swelling.      Cervical back: Neck supple.   Skin:     General: Skin is warm and dry.   Neurological:      Mental Status: He is alert.   Psychiatric:         Mood and Affect: Mood normal.         Pertinent Laboratory/Diagnostic Studies:    Laboratory studies reviewed personally by Rosemary Deluca MD    BMP:   Lab Results   Component Value Date    SODIUM 140 12/12/2023    K 4.0 12/12/2023     12/12/2023    CO2 33 (H) 12/12/2023    BUN 15 12/12/2023    CREATININE 0.84 12/12/2023    CALCIUM 10.1 12/12/2023     CBC:  Lab Results   Component Value Date    WBC 6.18 12/12/2023    HGB 14.1 12/12/2023    HCT 42.7 12/12/2023    MCV 93 12/12/2023     12/12/2023     Lipid Profile:   Lab Results   Component Value Date    HDL 54 12/12/2023     Lab Results   Component Value Date    LDLCALC 113 (H) 12/12/2023     Lab Results   Component Value Date    TRIG 78 12/12/2023      Other labs:  No results found for: \"[id number]\", \"TSH\"  Lab Results   Component Value Date    ALT 23 12/12/2023    AST 27 12/12/2023     Lab Results   Component Value Date    HGBA1C 5.4 12/30/2022         Imaging Studies:     Pertinent imaging studies and cardiac studies were independently reviewed on this visit and findings summarized.    Rosemary Deluca MD, PhD    Portions of the record may have been created with voice recognition software.  Occasional wrong word or \"sound alike\" substitutions may have occurred due to the inherent limitations of voice recognition software.  Read the chart carefully and recognize, using context, where substitutions have occurred. Please reach out to me directly for any clarifications.   "

## 2025-04-22 ENCOUNTER — APPOINTMENT (OUTPATIENT)
Dept: LAB | Facility: MEDICAL CENTER | Age: 80
End: 2025-04-22
Payer: MEDICARE

## 2025-05-19 ENCOUNTER — APPOINTMENT (OUTPATIENT)
Dept: LAB | Facility: MEDICAL CENTER | Age: 80
End: 2025-05-19
Attending: STUDENT IN AN ORGANIZED HEALTH CARE EDUCATION/TRAINING PROGRAM
Payer: MEDICARE

## 2025-05-19 DIAGNOSIS — I48.19 PERSISTENT ATRIAL FIBRILLATION (HCC): ICD-10-CM

## 2025-05-19 DIAGNOSIS — E78.5 DYSLIPIDEMIA: ICD-10-CM

## 2025-05-19 LAB
CHOLEST SERPL-MCNC: 173 MG/DL (ref ?–200)
ERYTHROCYTE [DISTWIDTH] IN BLOOD BY AUTOMATED COUNT: 13.1 % (ref 11.6–15.1)
HCT VFR BLD AUTO: 40.8 % (ref 36.5–49.3)
HDLC SERPL-MCNC: 57 MG/DL
HGB BLD-MCNC: 13.5 G/DL (ref 12–17)
LDLC SERPL CALC-MCNC: 100 MG/DL (ref 0–100)
MCH RBC QN AUTO: 30 PG (ref 26.8–34.3)
MCHC RBC AUTO-ENTMCNC: 33.1 G/DL (ref 31.4–37.4)
MCV RBC AUTO: 91 FL (ref 82–98)
PLATELET # BLD AUTO: 224 THOUSANDS/UL (ref 149–390)
PMV BLD AUTO: 12.5 FL (ref 8.9–12.7)
RBC # BLD AUTO: 4.5 MILLION/UL (ref 3.88–5.62)
TRIGL SERPL-MCNC: 81 MG/DL (ref ?–150)
WBC # BLD AUTO: 6.05 THOUSAND/UL (ref 4.31–10.16)

## 2025-05-19 PROCEDURE — 36415 COLL VENOUS BLD VENIPUNCTURE: CPT

## 2025-05-19 PROCEDURE — 80061 LIPID PANEL: CPT

## 2025-05-19 PROCEDURE — 85027 COMPLETE CBC AUTOMATED: CPT

## 2025-05-21 ENCOUNTER — RESULTS FOLLOW-UP (OUTPATIENT)
Dept: NON INVASIVE DIAGNOSTICS | Facility: HOSPITAL | Age: 80
End: 2025-05-21

## 2025-05-27 ENCOUNTER — ANESTHESIA EVENT (OUTPATIENT)
Dept: NON INVASIVE DIAGNOSTICS | Facility: HOSPITAL | Age: 80
End: 2025-05-27
Payer: MEDICARE

## 2025-05-27 ENCOUNTER — HOSPITAL ENCOUNTER (OUTPATIENT)
Dept: NON INVASIVE DIAGNOSTICS | Facility: HOSPITAL | Age: 80
Discharge: HOME | End: 2025-05-27
Attending: STUDENT IN AN ORGANIZED HEALTH CARE EDUCATION/TRAINING PROGRAM
Payer: MEDICARE

## 2025-05-27 ENCOUNTER — ANESTHESIA (OUTPATIENT)
Dept: NON INVASIVE DIAGNOSTICS | Facility: HOSPITAL | Age: 80
End: 2025-05-27
Payer: MEDICARE

## 2025-05-27 VITALS
DIASTOLIC BLOOD PRESSURE: 68 MMHG | SYSTOLIC BLOOD PRESSURE: 158 MMHG | HEART RATE: 54 BPM | WEIGHT: 199.08 LBS | OXYGEN SATURATION: 100 % | RESPIRATION RATE: 20 BRPM | TEMPERATURE: 97.8 F | BODY MASS INDEX: 30.17 KG/M2 | HEIGHT: 68 IN

## 2025-05-27 DIAGNOSIS — Z09 FOLLOW-UP VISIT FOR AORTIC VALVE REPLACEMENT WITH BIOPROSTHETIC VALVE: ICD-10-CM

## 2025-05-27 DIAGNOSIS — Z95.3 FOLLOW-UP VISIT FOR AORTIC VALVE REPLACEMENT WITH BIOPROSTHETIC VALVE: ICD-10-CM

## 2025-05-27 DIAGNOSIS — I35.0 SEVERE AORTIC STENOSIS: ICD-10-CM

## 2025-05-27 LAB
AORTIC VALVE MEAN VELOCITY: 32.2 M/S
AV AREA BY CONTINUOUS VTI: 0.8 CM2
AV AREA PEAK VELOCITY: 0.7 CM2
AV LVOT MEAN GRADIENT: 4 MMHG
AV LVOT PEAK GRADIENT: 7 MMHG
AV MEAN PRESS GRAD SYS DOP V1V2: 47 MMHG
AV ORIFICE AREA US: 0.88 CM2
AV PEAK GRADIENT: 81 MMHG
AV VELOCITY RATIO: 0.25
AV VMAX SYS DOP: 4.5 M/S
DOP CALC AO VTI: 113.82 CM
DOP CALC LVOT AREA: 3.46 CM2
DOP CALC LVOT CARDIAC INDEX: 3.03 L/MIN/M2
DOP CALC LVOT CARDIAC OUTPUT: 6.16 L/MIN
DOP CALC LVOT DIAMETER: 2.1 CM
DOP CALC LVOT PEAK VEL VTI: 29 CM
DOP CALC LVOT PEAK VEL: 1.28 M/S
DOP CALC LVOT STROKE INDEX: 43.3 ML/M2
DOP CALC LVOT STROKE VOLUME: 100.39 CM3
SL CV LV EF: 55

## 2025-05-27 PROCEDURE — 93325 DOPPLER ECHO COLOR FLOW MAPG: CPT | Performed by: INTERNAL MEDICINE

## 2025-05-27 PROCEDURE — 93312 ECHO TRANSESOPHAGEAL: CPT

## 2025-05-27 PROCEDURE — 93320 DOPPLER ECHO COMPLETE: CPT | Performed by: INTERNAL MEDICINE

## 2025-05-27 PROCEDURE — 93312 ECHO TRANSESOPHAGEAL: CPT | Performed by: INTERNAL MEDICINE

## 2025-05-27 RX ORDER — SODIUM CHLORIDE, SODIUM LACTATE, POTASSIUM CHLORIDE, CALCIUM CHLORIDE 600; 310; 30; 20 MG/100ML; MG/100ML; MG/100ML; MG/100ML
INJECTION, SOLUTION INTRAVENOUS CONTINUOUS PRN
Status: DISCONTINUED | OUTPATIENT
Start: 2025-05-27 | End: 2025-05-27

## 2025-05-27 RX ORDER — FENTANYL CITRATE 50 UG/ML
INJECTION, SOLUTION INTRAMUSCULAR; INTRAVENOUS AS NEEDED
Status: DISCONTINUED | OUTPATIENT
Start: 2025-05-27 | End: 2025-05-27

## 2025-05-27 RX ORDER — LIDOCAINE HYDROCHLORIDE 20 MG/ML
INJECTION, SOLUTION EPIDURAL; INFILTRATION; INTRACAUDAL; PERINEURAL AS NEEDED
Status: DISCONTINUED | OUTPATIENT
Start: 2025-05-27 | End: 2025-05-27

## 2025-05-27 RX ORDER — PROPOFOL 10 MG/ML
INJECTION, EMULSION INTRAVENOUS AS NEEDED
Status: DISCONTINUED | OUTPATIENT
Start: 2025-05-27 | End: 2025-05-27

## 2025-05-27 RX ADMIN — FENTANYL CITRATE 50 MCG: 50 INJECTION INTRAMUSCULAR; INTRAVENOUS at 12:37

## 2025-05-27 RX ADMIN — LIDOCAINE HYDROCHLORIDE 100 MG: 20 INJECTION, SOLUTION EPIDURAL; INFILTRATION; INTRACAUDAL at 12:39

## 2025-05-27 RX ADMIN — PROPOFOL 20 MG: 10 INJECTION, EMULSION INTRAVENOUS at 12:50

## 2025-05-27 RX ADMIN — SODIUM CHLORIDE, SODIUM LACTATE, POTASSIUM CHLORIDE, AND CALCIUM CHLORIDE: .6; .31; .03; .02 INJECTION, SOLUTION INTRAVENOUS at 12:14

## 2025-05-27 RX ADMIN — PROPOFOL 20 MG: 10 INJECTION, EMULSION INTRAVENOUS at 12:42

## 2025-05-27 RX ADMIN — PROPOFOL 90 MG: 10 INJECTION, EMULSION INTRAVENOUS at 12:39

## 2025-05-27 NOTE — ANESTHESIA POSTPROCEDURE EVALUATION
Post-Op Assessment Note    CV Status:  Stable  Pain Score: 0    Pain management: adequate       Mental Status:  Awake and alert   Hydration Status:  Stable   PONV Controlled:  None   Airway Patency:  Patent and adequate     Post Op Vitals Reviewed: Yes    No anethesia notable event occurred.    Staff: CRNA, Anesthesiologist           Last Filed PACU Vitals:  Vitals Value Taken Time   Temp     Pulse     BP     Resp     SpO2

## 2025-05-27 NOTE — ANESTHESIA PREPROCEDURE EVALUATION
Procedure:  LORENA    Hx of AVR, now with elevated gradients  Chronic afib on xarelto  No CP or SOB with exertion  Occasional dizziness with sitting to standing per notes    Relevant Problems   CARDIO   (+) Bicuspid aortic valve   (+) Essential hypertension   (+) Persistent atrial fibrillation (HCC)      NEURO/PSYCH   (+) Cardioembolic stroke (HCC)      12/26/24 echo - There is a bioprosthetic aortic valve. 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 He has peak velocity across the valve of 4 m/s2, DVI 0.17, with increased acceleration time of 115 ms. Calculated YAJAIRA of 0.5 cm2.     EKG 4/14/25     Impression  Atrial fibrillation. IVCD. 61 bpm.     Physical Exam    Airway     Mallampati score: II  TM Distance: >3 FB  Neck ROM: full      Cardiovascular  Rhythm: irregular, Rate: normal    Dental       Pulmonary      Neurological    He appears awake, alert and oriented x3.      Other Findings        Anesthesia Plan  ASA Score- 3     Anesthesia Type- IV sedation with anesthesia with ASA Monitors.         Additional Monitors:     Airway Plan:     Comment: Recent labs personally reviewed:  Lab Results       Component                Value               Date                       WBC                      6.05                05/19/2025                 HGB                      13.5                05/19/2025                 PLT                      224                 05/19/2025            Lab Results       Component                Value               Date                       K                        4.0                 12/12/2023                 BUN                      15                  12/12/2023                 CREATININE               0.84                12/12/2023            Lab Results       Component                Value               Date                       PTT                      36 (H)              05/14/2018             Lab Results       Component                Value               Date                        INR                      1.31 (H)            05/14/2018              Blood type     Patient was consented for sedation with IV anesthetic. Discussed that we will maintain spontaneous respirations and utilize supplemental O2. I discussed the risks of aspiration, hypoxia, laryngospasm and bronchospasm. I discussed the scenarios related to conversion to general anesthetic. All questions answered.     I, Sanjana Leblanc MD, have personally seen and evaluated the patient prior to anesthetic care.  I have reviewed the pre-anesthetic record, medical history, allergies, medications and any other medical records if appropriate to the anesthetic care.  If a CRNA is involved in the case, I have reviewed the CRNA assessment, if present, and agree. Patient consented for IV Sedation, general anesthesia as back up. Discussed risks of aspiration, IV infiltration, indications for conversion to general anesthesia. All questions and concerns addressed.   .       Plan Factors-Exercise tolerance (METS): >4 METS.    Chart reviewed. EKG reviewed. Imaging results reviewed. Existing labs reviewed. Patient summary reviewed.    Patient is not a current smoker.  Patient did not smoke on day of surgery.    Obstructive sleep apnea risk education given perioperatively.        Induction-     Postoperative Plan- .   Monitoring Plan - Monitoring plan - standard ASA monitoring          Informed Consent- Anesthetic plan and risks discussed with patient.  I personally reviewed this patient with the CRNA. Discussed and agreed on the Anesthesia Plan with the CRNA..      NPO Status:  Vitals Value Taken Time   Date of last liquid 05/26/25 05/27/25 11:22   Time of last liquid 2200 05/27/25 11:22   Date of last solid 05/26/25 05/27/25 11:22   Time of last solid 2200 05/27/25 11:22

## 2025-05-27 NOTE — ANESTHESIA POSTPROCEDURE EVALUATION
Post-Op Assessment Note    CV Status:  Stable    Pain management: adequate       Mental Status:  Alert and awake   Hydration Status:  Euvolemic   PONV Controlled:  Controlled   Airway Patency:  Patent  Two or more mitigation strategies used for obstructive sleep apnea   Post Op Vitals Reviewed: Yes    No anethesia notable event occurred.    Staff: Anesthesiologist           Last Filed PACU Vitals:  Vitals Value Taken Time   Temp     Pulse     BP     Resp     SpO2

## 2025-05-30 ENCOUNTER — RESULTS FOLLOW-UP (OUTPATIENT)
Dept: CARDIOLOGY CLINIC | Facility: CLINIC | Age: 80
End: 2025-05-30

## 2025-06-12 ENCOUNTER — IOP CHECK (OUTPATIENT)
Dept: URBAN - METROPOLITAN AREA CLINIC 6 | Facility: CLINIC | Age: 80
End: 2025-06-12

## 2025-06-12 DIAGNOSIS — H40.023: ICD-10-CM

## 2025-06-12 DIAGNOSIS — Z96.1: ICD-10-CM

## 2025-06-12 DIAGNOSIS — H04.123: ICD-10-CM

## 2025-06-12 PROCEDURE — 92012 INTRM OPH EXAM EST PATIENT: CPT

## 2025-06-12 PROCEDURE — 92083 EXTENDED VISUAL FIELD XM: CPT

## 2025-06-12 ASSESSMENT — TONOMETRY
OD_IOP_MMHG: 23
OS_IOP_MMHG: 22

## 2025-06-12 ASSESSMENT — VISUAL ACUITY
OD_SC: 20/20
OS_SC: 20/20

## 2025-06-26 DIAGNOSIS — I48.19 PERSISTENT ATRIAL FIBRILLATION (HCC): ICD-10-CM

## 2025-06-26 DIAGNOSIS — I15.9 SECONDARY HYPERTENSION: ICD-10-CM

## 2025-07-03 ENCOUNTER — OFFICE VISIT (OUTPATIENT)
Dept: CARDIOLOGY CLINIC | Facility: MEDICAL CENTER | Age: 80
End: 2025-07-03
Payer: MEDICARE

## 2025-07-03 VITALS
HEIGHT: 68 IN | HEART RATE: 67 BPM | WEIGHT: 195 LBS | DIASTOLIC BLOOD PRESSURE: 70 MMHG | OXYGEN SATURATION: 99 % | BODY MASS INDEX: 29.55 KG/M2 | SYSTOLIC BLOOD PRESSURE: 130 MMHG

## 2025-07-03 DIAGNOSIS — Z79.01 CHRONIC ANTICOAGULATION: ICD-10-CM

## 2025-07-03 DIAGNOSIS — Z95.2 H/O PROSTHETIC AORTIC VALVE REPLACEMENT: ICD-10-CM

## 2025-07-03 DIAGNOSIS — I48.19 PERSISTENT ATRIAL FIBRILLATION (HCC): Primary | ICD-10-CM

## 2025-07-03 DIAGNOSIS — E78.5 DYSLIPIDEMIA: ICD-10-CM

## 2025-07-03 DIAGNOSIS — T82.857D STENOSIS OF PROSTHETIC AORTIC VALVE, SUBSEQUENT ENCOUNTER: ICD-10-CM

## 2025-07-03 DIAGNOSIS — I10 ESSENTIAL HYPERTENSION: ICD-10-CM

## 2025-07-03 DIAGNOSIS — E66.01 OBESITY, MORBID (HCC): ICD-10-CM

## 2025-07-03 DIAGNOSIS — I63.9 CARDIOEMBOLIC STROKE (HCC): ICD-10-CM

## 2025-07-03 PROCEDURE — 99214 OFFICE O/P EST MOD 30 MIN: CPT | Performed by: STUDENT IN AN ORGANIZED HEALTH CARE EDUCATION/TRAINING PROGRAM

## 2025-07-03 NOTE — PROGRESS NOTES
[facility] [place]  [address]  [address]  [facility] [address]  Phone#  [phone]  Fax#  [phone]  [facility]'s Cardiology Office Follow-up Visit             NAME: Toni Mattson  AGE: 79 y.o. SEX: male   : 1945   MRN: [id number]    DATE: 7/3/2025  TIME: 10:41 AM      Assessment & Plan  Stenosis of prosthetic aortic valve, subsequent encounter  We reviewed LORENA results from 25. 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 I discussed that he would benefit from valve clinic evaluation for potential valve-in-valve MEME if/when he becomes symptomatic.   25 LORENA - severe bioprosthetic AV stenosis. Peak velocity 4.5 m/s, mean gradient 47 mmHg. DVI 0.25. YAJAIRA 0.88 cm2.  Sent referral to CT surgery/valve clinic    H/O prosthetic aortic valve replacement  Now with severe stenosis  Management as above  He is not taking aspirin due to history of frequent epistaxis (requiring cauterization)   Continue Xarelto 20 mg daily    Persistent atrial fibrillation (HCC)  On chronic anticoagulation  Stable.  Does not have tachycardia even off of AV nir agents.  Patient reports that he has episodes where he feels like his heart rate is low.  However these occur very infrequently, less than once a month.  I asked him to please check his heart rate during this with a pulse oximeter.  I asked him to call the office if his heart rates are lower than 50s.  If he has symptoms and has a low heart rate, I urged him to go to the emergency room.  If these episodes do become more frequent, I discussed that I would like to order a Zio patch to monitor for symptomatic bradycardia.  However, he has no documented bradycardia at home.  Continue Xarelto 20 mg daily  No AV nir blocking agents due to bradycardia      Dyslipidemia  Stable.  25 Lipid panel showed total cholesterol 173, HDL 57, TG 81,    Continue pravastatin 20 mg daily    Essential hypertension  Stable.  Blood pressure   130/70 today.    Continue losartan/HCTZ 100/25 mg daily    Cardioembolic stroke (HCC)  Continue Xarelto 20 mg daily    Follow up 6 mo of bioprosthetic AS      -----    Chief Complaint   Patient presents with    Follow-up     3 month f/up       HPI:    Toni Mattson is a 79 y.o.-year-old male who presents to the cardiology clinic for follow up for the above-listed problems.     History of aortic stenosis with bicuspid aortic valve, status post bioprosthetic aortic valve replacement in 2007.  He also has a history of persistent atrial fibrillation rate controlled off of AV nir agents, hypertension, prior cardioembolic stroke.    I initially saw him 10/23/24 for routine follow-up.  He has no concerns of chest pain, chest discomfort, or palpitations.  Denies dizziness.  Occasionally feels lightheaded when changing from a sitting to standing position.  Denies lightheadedness otherwise.  States that occasionally he will have a sensation where he feels like his heart rate get swallowing.  Is unable to describe this to me further.  Denies any chest pain or chest discomfort at that time.  Has not checked his heart rate during those times.    He denies chest pain, chest discomfort, shortness or breath at rest, dyspnea on exertion, edema, palpitations.     I personally reviewed LORENA from 5/27/25 with supplement TTE images  YAJAIRA 0.69 cm2.  Vmax 4.6 m/s  Peak and mean gradient 85 mmHg, 47 mmHg    -----    I personally reviewed his LORENA (see above)  Parsonally reviewed 5/19/25 Lipid panel. Total cholesterol 173, HDL 57, TG 81,      Past history, family history, social history, current medications, vital signs, recent lab and imaging studies and  prior cardiology studies reviewed independently on this visit.    No Known Allergies    Current Outpatient Medications:     aspirin 81 MG tablet, Take 1 tablet by mouth in the morning., Disp: , Rfl:     finasteride (PROSCAR) 5 mg tablet, , Disp: , Rfl:      losartan-hydrochlorothiazide (HYZAAR) 100-25 MG per tablet, Take by mouth in the morning., Disp: , Rfl:     Multiple Vitamin Essential TABS, Take 1 tablet by mouth in the morning., Disp: , Rfl:     pravastatin (PRAVACHOL) 20 mg tablet, Take 1 tablet by mouth in the morning., Disp: , Rfl:     rivaroxaban (Xarelto) 20 mg tablet, Take 1 tablet (20 mg total) by mouth daily, Disp: 90 tablet, Rfl: 0    VITAMIN B COMPLEX-C CAPS, Take 1 capsule by mouth in the morning., Disp: , Rfl:     Review of Systems   Constitutional:  Negative for fatigue.   Respiratory:  Negative for chest tightness and shortness of breath.    Cardiovascular:  Negative for chest pain, palpitations and leg swelling.       Objective:     Vitals:    07/03/25 1035   BP: 130/70   Pulse: 67   SpO2: 99%         Wt Readings from Last 3 Encounters:   07/03/25 88.5 kg (195 lb)   05/27/25 90.3 kg (199 lb 1.2 oz)   04/14/25 90.3 kg (199 lb)     Pulse Readings from Last 3 Encounters:   07/03/25 67   05/27/25 (!) 54   04/14/25 79     BP Readings from Last 3 Encounters:   07/03/25 130/70   05/27/25 158/68   04/14/25 126/68     Physical Exam  Vitals and nursing note reviewed.   Constitutional:       General: He is not in acute distress.     Appearance: Normal appearance. He is well-developed.   HENT:      Head: Normocephalic and atraumatic.     Cardiovascular:      Rate and Rhythm: Normal rate. Rhythm irregularly irregular.      Heart sounds: S1 normal and S2 normal. Murmur (with preserved S2) heard.      Crescendo decrescendo systolic murmur is present with a grade of 3/6.   Pulmonary:      Effort: Pulmonary effort is normal. No respiratory distress.      Breath sounds: Normal breath sounds.   Abdominal:      General: There is no distension.     Musculoskeletal:         General: No swelling.      Cervical back: Neck supple.     Skin:     General: Skin is warm and dry.     Neurological:      Mental Status: He is alert.     Psychiatric:         Mood and Affect: Mood  "normal.         Pertinent Laboratory/Diagnostic Studies:    Laboratory studies reviewed personally by Rosemary Deluca MD    BMP:   Lab Results   Component Value Date    SODIUM 140 12/12/2023    K 4.0 12/12/2023     12/12/2023    CO2 33 (H) 12/12/2023    BUN 15 12/12/2023    CREATININE 0.84 12/12/2023    CALCIUM 10.1 12/12/2023     CBC:  Lab Results   Component Value Date    WBC 6.05 05/19/2025    HGB 13.5 05/19/2025    HCT 40.8 05/19/2025    MCV 91 05/19/2025     05/19/2025     Lipid Profile:   Lab Results   Component Value Date    HDL 57 05/19/2025     Lab Results   Component Value Date    LDLCALC 100 05/19/2025     Lab Results   Component Value Date    TRIG 81 05/19/2025      Other labs:  No results found for: \"[id number]\", \"TSH\"  Lab Results   Component Value Date    ALT 23 12/12/2023    AST 27 12/12/2023     Lab Results   Component Value Date    HGBA1C 5.4 12/30/2022         Imaging Studies:     Pertinent imaging studies and cardiac studies were independently reviewed on this visit and findings summarized.    Rosemary Deluca MD, PhD    Portions of the record may have been created with voice recognition software.  Occasional wrong word or \"sound alike\" substitutions may have occurred due to the inherent limitations of voice recognition software.  Read the chart carefully and recognize, using context, where substitutions have occurred. Please reach out to me directly for any clarifications.   "

## 2025-07-07 ENCOUNTER — TELEPHONE (OUTPATIENT)
Dept: CARDIAC SURGERY | Facility: CLINIC | Age: 80
End: 2025-07-07

## 2025-07-10 ENCOUNTER — TELEPHONE (OUTPATIENT)
Dept: CARDIAC SURGERY | Facility: CLINIC | Age: 80
End: 2025-07-10

## 2025-07-10 NOTE — TELEPHONE ENCOUNTER
Called and spoke with patient's wife Deirdre. I explained to her that Toni was referred to the Valve Clinic to be seen by one of our surgeons for a consult. Scheduled patient to come in on 8/6. All questions addressed.

## 2025-08-06 ENCOUNTER — OFFICE VISIT (OUTPATIENT)
Dept: CARDIAC SURGERY | Facility: CLINIC | Age: 80
End: 2025-08-06
Payer: MEDICARE

## 2025-08-06 VITALS
HEART RATE: 55 BPM | HEIGHT: 68 IN | WEIGHT: 197.1 LBS | BODY MASS INDEX: 29.87 KG/M2 | SYSTOLIC BLOOD PRESSURE: 158 MMHG | DIASTOLIC BLOOD PRESSURE: 69 MMHG | OXYGEN SATURATION: 100 %

## 2025-08-06 DIAGNOSIS — Z13.6 ENCOUNTER FOR SCREENING FOR STENOSIS OF CAROTID ARTERY: ICD-10-CM

## 2025-08-06 DIAGNOSIS — Z01.812 ENCOUNTER FOR PRE-OPERATIVE LABORATORY TESTING: ICD-10-CM

## 2025-08-06 DIAGNOSIS — Z01.810 PRE-OPERATIVE CARDIOVASCULAR EXAMINATION: ICD-10-CM

## 2025-08-06 DIAGNOSIS — R09.89 CAROTID BRUIT, UNSPECIFIED LATERALITY: ICD-10-CM

## 2025-08-06 DIAGNOSIS — T82.857A STENOSIS OF PROSTHETIC AORTIC VALVE, INITIAL ENCOUNTER: Primary | ICD-10-CM

## 2025-08-06 DIAGNOSIS — Z95.2 H/O PROSTHETIC AORTIC VALVE REPLACEMENT: ICD-10-CM

## 2025-08-06 PROCEDURE — 99204 OFFICE O/P NEW MOD 45 MIN: CPT | Performed by: THORACIC SURGERY (CARDIOTHORACIC VASCULAR SURGERY)

## 2025-08-06 RX ORDER — LATANOPROSTENE BUNOD 0.24 MG/ML
SOLUTION/ DROPS OPHTHALMIC
COMMUNITY

## 2025-08-06 RX ORDER — AMOXICILLIN 500 MG/1
500 CAPSULE ORAL
COMMUNITY
Start: 2025-05-19

## 2025-08-07 ENCOUNTER — TELEPHONE (OUTPATIENT)
Dept: CARDIOLOGY CLINIC | Facility: CLINIC | Age: 80
End: 2025-08-07

## 2025-08-07 ENCOUNTER — PREP FOR PROCEDURE (OUTPATIENT)
Dept: CARDIOLOGY CLINIC | Facility: CLINIC | Age: 80
End: 2025-08-07

## 2025-08-07 DIAGNOSIS — I48.19 PERSISTENT ATRIAL FIBRILLATION (HCC): ICD-10-CM

## 2025-08-07 DIAGNOSIS — Z95.2 H/O PROSTHETIC AORTIC VALVE REPLACEMENT: ICD-10-CM

## 2025-08-07 DIAGNOSIS — I35.0 SEVERE AORTIC STENOSIS: ICD-10-CM

## 2025-08-07 DIAGNOSIS — I35.0 SEVERE AORTIC STENOSIS: Primary | ICD-10-CM

## 2025-08-07 DIAGNOSIS — Q23.81 BICUSPID AORTIC VALVE: ICD-10-CM

## 2025-08-07 DIAGNOSIS — T82.857D STENOSIS OF PROSTHETIC AORTIC VALVE, SUBSEQUENT ENCOUNTER: Primary | ICD-10-CM

## 2025-08-08 ENCOUNTER — APPOINTMENT (OUTPATIENT)
Dept: LAB | Facility: MEDICAL CENTER | Age: 80
End: 2025-08-08
Payer: MEDICARE

## 2025-08-15 ENCOUNTER — HOSPITAL ENCOUNTER (OUTPATIENT)
Dept: RADIOLOGY | Facility: HOSPITAL | Age: 80
Discharge: HOME | End: 2025-08-15
Attending: PHYSICIAN ASSISTANT
Payer: MEDICARE

## 2025-08-15 ENCOUNTER — HOSPITAL ENCOUNTER (OUTPATIENT)
Dept: NON INVASIVE DIAGNOSTICS | Facility: HOSPITAL | Age: 80
Discharge: HOME | End: 2025-08-15
Attending: PHYSICIAN ASSISTANT
Payer: MEDICARE

## 2025-08-16 ENCOUNTER — APPOINTMENT (OUTPATIENT)
Dept: LAB | Facility: MEDICAL CENTER | Age: 80
End: 2025-08-16
Payer: MEDICARE

## 2025-08-16 PROCEDURE — 85025 COMPLETE CBC W/AUTO DIFF WBC: CPT

## 2025-08-16 PROCEDURE — 80053 COMPREHEN METABOLIC PANEL: CPT

## 2025-08-26 PROBLEM — I35.0 SEVERE AORTIC STENOSIS: Status: ACTIVE | Noted: 2025-08-26

## (undated) RX ORDER — LATANOPROSTENE BUNOD 0.24 MG/ML
1 SOLUTION/ DROPS OPHTHALMIC EVERY EVENING
Start: 2022-02-03